# Patient Record
Sex: FEMALE | Race: OTHER | NOT HISPANIC OR LATINO
[De-identification: names, ages, dates, MRNs, and addresses within clinical notes are randomized per-mention and may not be internally consistent; named-entity substitution may affect disease eponyms.]

---

## 2017-04-27 ENCOUNTER — APPOINTMENT (OUTPATIENT)
Dept: NEUROLOGY | Facility: CLINIC | Age: 42
End: 2017-04-27

## 2017-04-27 VITALS
HEIGHT: 64 IN | HEART RATE: 76 BPM | BODY MASS INDEX: 26.29 KG/M2 | WEIGHT: 154 LBS | DIASTOLIC BLOOD PRESSURE: 60 MMHG | SYSTOLIC BLOOD PRESSURE: 110 MMHG

## 2017-04-27 DIAGNOSIS — Z87.898 PERSONAL HISTORY OF OTHER SPECIFIED CONDITIONS: ICD-10-CM

## 2017-04-27 DIAGNOSIS — M94.0 CHONDROCOSTAL JUNCTION SYNDROME [TIETZE]: ICD-10-CM

## 2017-04-27 DIAGNOSIS — Z78.9 OTHER SPECIFIED HEALTH STATUS: ICD-10-CM

## 2017-04-27 DIAGNOSIS — I34.0 NONRHEUMATIC MITRAL (VALVE) INSUFFICIENCY: ICD-10-CM

## 2017-04-27 DIAGNOSIS — Z82.49 FAMILY HISTORY OF ISCHEMIC HEART DISEASE AND OTHER DISEASES OF THE CIRCULATORY SYSTEM: ICD-10-CM

## 2017-04-27 RX ORDER — DICLOFENAC POTASSIUM 50 MG/1
TABLET, COATED ORAL
Refills: 0 | Status: ACTIVE | COMMUNITY

## 2017-05-05 ENCOUNTER — APPOINTMENT (OUTPATIENT)
Dept: OTOLARYNGOLOGY | Facility: CLINIC | Age: 42
End: 2017-05-05

## 2017-05-05 VITALS
HEIGHT: 64.4 IN | SYSTOLIC BLOOD PRESSURE: 126 MMHG | BODY MASS INDEX: 25.64 KG/M2 | DIASTOLIC BLOOD PRESSURE: 74 MMHG | HEART RATE: 77 BPM | WEIGHT: 152 LBS

## 2017-06-19 ENCOUNTER — APPOINTMENT (OUTPATIENT)
Dept: PAIN MANAGEMENT | Facility: CLINIC | Age: 42
End: 2017-06-19

## 2017-06-19 VITALS
DIASTOLIC BLOOD PRESSURE: 74 MMHG | SYSTOLIC BLOOD PRESSURE: 117 MMHG | WEIGHT: 152 LBS | HEIGHT: 64.75 IN | BODY MASS INDEX: 25.64 KG/M2 | HEART RATE: 84 BPM

## 2017-06-19 RX ORDER — NORTRIPTYLINE HYDROCHLORIDE 10 MG/1
10 CAPSULE ORAL
Qty: 30 | Refills: 0 | Status: COMPLETED | COMMUNITY
Start: 2016-12-15

## 2017-06-23 ENCOUNTER — MEDICATION RENEWAL (OUTPATIENT)
Age: 42
End: 2017-06-23

## 2017-06-23 ENCOUNTER — APPOINTMENT (OUTPATIENT)
Dept: MRI IMAGING | Facility: CLINIC | Age: 42
End: 2017-06-23

## 2017-06-23 ENCOUNTER — FORM ENCOUNTER (OUTPATIENT)
Age: 42
End: 2017-06-23

## 2017-06-24 ENCOUNTER — APPOINTMENT (OUTPATIENT)
Dept: MRI IMAGING | Facility: CLINIC | Age: 42
End: 2017-06-24

## 2017-06-24 ENCOUNTER — OUTPATIENT (OUTPATIENT)
Dept: OUTPATIENT SERVICES | Facility: HOSPITAL | Age: 42
LOS: 1 days | End: 2017-06-24
Payer: COMMERCIAL

## 2017-06-24 DIAGNOSIS — Z00.8 ENCOUNTER FOR OTHER GENERAL EXAMINATION: ICD-10-CM

## 2017-06-24 PROCEDURE — 70549 MR ANGIOGRAPH NECK W/O&W/DYE: CPT

## 2017-07-10 ENCOUNTER — APPOINTMENT (OUTPATIENT)
Dept: NEUROLOGY | Facility: CLINIC | Age: 42
End: 2017-07-10

## 2017-07-10 VITALS
SYSTOLIC BLOOD PRESSURE: 110 MMHG | HEART RATE: 80 BPM | BODY MASS INDEX: 25.3 KG/M2 | HEIGHT: 64.75 IN | DIASTOLIC BLOOD PRESSURE: 71 MMHG | WEIGHT: 150 LBS

## 2017-07-18 ENCOUNTER — MEDICATION RENEWAL (OUTPATIENT)
Age: 42
End: 2017-07-18

## 2017-08-01 ENCOUNTER — FORM ENCOUNTER (OUTPATIENT)
Age: 42
End: 2017-08-01

## 2017-08-01 ENCOUNTER — MESSAGE (OUTPATIENT)
Age: 42
End: 2017-08-01

## 2017-08-01 RX ORDER — DEXAMETHASONE 4 MG/1
4 TABLET ORAL
Qty: 20 | Refills: 0 | Status: ACTIVE | COMMUNITY
Start: 2017-08-01 | End: 1900-01-01

## 2017-08-02 ENCOUNTER — APPOINTMENT (OUTPATIENT)
Dept: MRI IMAGING | Facility: CLINIC | Age: 42
End: 2017-08-02
Payer: COMMERCIAL

## 2017-08-02 ENCOUNTER — OUTPATIENT (OUTPATIENT)
Dept: OUTPATIENT SERVICES | Facility: HOSPITAL | Age: 42
LOS: 1 days | End: 2017-08-02
Payer: COMMERCIAL

## 2017-08-02 DIAGNOSIS — R53.1 WEAKNESS: ICD-10-CM

## 2017-08-02 PROCEDURE — 70553 MRI BRAIN STEM W/O & W/DYE: CPT | Mod: 26

## 2017-08-02 PROCEDURE — 70553 MRI BRAIN STEM W/O & W/DYE: CPT

## 2017-08-02 PROCEDURE — A9585: CPT

## 2017-08-03 ENCOUNTER — APPOINTMENT (OUTPATIENT)
Dept: PAIN MANAGEMENT | Facility: CLINIC | Age: 42
End: 2017-08-03

## 2017-08-07 ENCOUNTER — APPOINTMENT (OUTPATIENT)
Dept: PAIN MANAGEMENT | Facility: CLINIC | Age: 42
End: 2017-08-07
Payer: COMMERCIAL

## 2017-08-07 VITALS
HEIGHT: 64.75 IN | DIASTOLIC BLOOD PRESSURE: 72 MMHG | HEART RATE: 63 BPM | SYSTOLIC BLOOD PRESSURE: 115 MMHG | WEIGHT: 153 LBS | BODY MASS INDEX: 25.8 KG/M2

## 2017-08-07 PROCEDURE — 99213 OFFICE O/P EST LOW 20 MIN: CPT

## 2017-08-07 RX ORDER — TOPIRAMATE 25 MG/1
25 TABLET, FILM COATED ORAL
Qty: 120 | Refills: 2 | Status: DISCONTINUED | COMMUNITY
Start: 2017-06-19 | End: 2017-08-07

## 2017-08-09 ENCOUNTER — MESSAGE (OUTPATIENT)
Age: 42
End: 2017-08-09

## 2017-08-11 ENCOUNTER — MESSAGE (OUTPATIENT)
Age: 42
End: 2017-08-11

## 2017-08-13 ENCOUNTER — FORM ENCOUNTER (OUTPATIENT)
Age: 42
End: 2017-08-13

## 2017-08-14 ENCOUNTER — OUTPATIENT (OUTPATIENT)
Dept: OUTPATIENT SERVICES | Facility: HOSPITAL | Age: 42
LOS: 1 days | End: 2017-08-14
Payer: COMMERCIAL

## 2017-08-14 ENCOUNTER — APPOINTMENT (OUTPATIENT)
Dept: MRI IMAGING | Facility: CLINIC | Age: 42
End: 2017-08-14
Payer: COMMERCIAL

## 2017-08-14 ENCOUNTER — MEDICATION RENEWAL (OUTPATIENT)
Age: 42
End: 2017-08-14

## 2017-08-14 DIAGNOSIS — R53.1 WEAKNESS: ICD-10-CM

## 2017-08-14 PROCEDURE — 72156 MRI NECK SPINE W/O & W/DYE: CPT | Mod: 26

## 2017-08-14 PROCEDURE — 72156 MRI NECK SPINE W/O & W/DYE: CPT

## 2017-08-14 PROCEDURE — A9585: CPT

## 2017-08-29 ENCOUNTER — MESSAGE (OUTPATIENT)
Age: 42
End: 2017-08-29

## 2017-09-07 ENCOUNTER — APPOINTMENT (OUTPATIENT)
Dept: PAIN MANAGEMENT | Facility: CLINIC | Age: 42
End: 2017-09-07
Payer: COMMERCIAL

## 2017-09-07 VITALS
BODY MASS INDEX: 26.46 KG/M2 | HEIGHT: 64 IN | WEIGHT: 155 LBS | HEART RATE: 78 BPM | DIASTOLIC BLOOD PRESSURE: 57 MMHG | SYSTOLIC BLOOD PRESSURE: 105 MMHG

## 2017-09-07 PROCEDURE — 99213 OFFICE O/P EST LOW 20 MIN: CPT

## 2017-09-07 RX ORDER — NABUMETONE 750 MG/1
750 TABLET, FILM COATED ORAL
Qty: 50 | Refills: 3 | Status: DISCONTINUED | COMMUNITY
Start: 2017-06-19 | End: 2017-09-07

## 2017-09-07 RX ORDER — VENLAFAXINE HYDROCHLORIDE 37.5 MG/1
37.5 CAPSULE, EXTENDED RELEASE ORAL
Qty: 70 | Refills: 0 | Status: DISCONTINUED | COMMUNITY
Start: 2017-07-18 | End: 2017-09-07

## 2017-09-12 ENCOUNTER — APPOINTMENT (OUTPATIENT)
Dept: NEUROLOGY | Facility: CLINIC | Age: 42
End: 2017-09-12
Payer: COMMERCIAL

## 2017-09-12 PROCEDURE — 95885 MUSC TST DONE W/NERV TST LIM: CPT

## 2017-09-12 PROCEDURE — 95911 NRV CNDJ TEST 9-10 STUDIES: CPT

## 2017-10-19 ENCOUNTER — APPOINTMENT (OUTPATIENT)
Dept: PAIN MANAGEMENT | Facility: CLINIC | Age: 42
End: 2017-10-19
Payer: COMMERCIAL

## 2017-10-19 ENCOUNTER — APPOINTMENT (OUTPATIENT)
Dept: NEUROLOGY | Facility: CLINIC | Age: 42
End: 2017-10-19
Payer: COMMERCIAL

## 2017-10-19 VITALS
BODY MASS INDEX: 26.63 KG/M2 | HEART RATE: 83 BPM | HEIGHT: 64 IN | DIASTOLIC BLOOD PRESSURE: 76 MMHG | SYSTOLIC BLOOD PRESSURE: 110 MMHG | WEIGHT: 156 LBS

## 2017-10-19 VITALS
HEART RATE: 86 BPM | BODY MASS INDEX: 26.63 KG/M2 | WEIGHT: 156 LBS | DIASTOLIC BLOOD PRESSURE: 75 MMHG | SYSTOLIC BLOOD PRESSURE: 113 MMHG | HEIGHT: 64 IN

## 2017-10-19 PROCEDURE — 99213 OFFICE O/P EST LOW 20 MIN: CPT

## 2017-10-19 PROCEDURE — 99214 OFFICE O/P EST MOD 30 MIN: CPT

## 2017-10-19 RX ORDER — LORAZEPAM 0.5 MG/1
0.5 TABLET ORAL
Qty: 2 | Refills: 0 | Status: DISCONTINUED | COMMUNITY
Start: 2017-07-18 | End: 2017-10-19

## 2017-10-19 RX ORDER — DULOXETINE HYDROCHLORIDE 20 MG/1
20 CAPSULE, DELAYED RELEASE PELLETS ORAL
Qty: 60 | Refills: 4 | Status: DISCONTINUED | COMMUNITY
Start: 2017-08-29 | End: 2017-10-19

## 2017-10-19 RX ORDER — LORAZEPAM 0.5 MG/1
0.5 TABLET ORAL
Qty: 2 | Refills: 0 | Status: DISCONTINUED | COMMUNITY
Start: 2017-06-23 | End: 2017-10-19

## 2017-10-19 RX ORDER — DIHYDROERGOTAMINE MESYLATE 4 MG/ML
4 SPRAY NASAL
Qty: 1 | Refills: 0 | Status: DISCONTINUED | COMMUNITY
Start: 2017-08-08 | End: 2017-10-19

## 2017-10-19 RX ORDER — DESVENLAFAXINE 25 MG/1
25 TABLET, EXTENDED RELEASE ORAL
Qty: 30 | Refills: 2 | Status: DISCONTINUED | COMMUNITY
Start: 2017-09-07 | End: 2017-10-19

## 2017-10-19 RX ORDER — DAPSONE 75 MG/G
7.5 GEL TOPICAL
Qty: 90 | Refills: 0 | Status: ACTIVE | COMMUNITY
Start: 2017-09-13

## 2017-10-19 RX ORDER — ADAPALENE AND BENZOYL PEROXIDE 3; 25 MG/G; MG/G
0.3-2.5 GEL TOPICAL
Qty: 45 | Refills: 0 | Status: ACTIVE | COMMUNITY
Start: 2017-09-13

## 2017-10-30 ENCOUNTER — TRANSCRIPTION ENCOUNTER (OUTPATIENT)
Age: 42
End: 2017-10-30

## 2017-11-09 ENCOUNTER — APPOINTMENT (OUTPATIENT)
Dept: PAIN MANAGEMENT | Facility: CLINIC | Age: 42
End: 2017-11-09
Payer: COMMERCIAL

## 2017-11-09 VITALS
SYSTOLIC BLOOD PRESSURE: 118 MMHG | WEIGHT: 158 LBS | DIASTOLIC BLOOD PRESSURE: 79 MMHG | BODY MASS INDEX: 26.98 KG/M2 | HEIGHT: 64 IN | HEART RATE: 79 BPM

## 2017-11-09 PROCEDURE — 99214 OFFICE O/P EST MOD 30 MIN: CPT

## 2017-11-16 ENCOUNTER — EMERGENCY (EMERGENCY)
Facility: HOSPITAL | Age: 42
LOS: 1 days | Discharge: HOME | End: 2017-11-16
Attending: EMERGENCY MEDICINE
Payer: COMMERCIAL

## 2017-11-16 VITALS
OXYGEN SATURATION: 100 % | RESPIRATION RATE: 18 BRPM | DIASTOLIC BLOOD PRESSURE: 65 MMHG | SYSTOLIC BLOOD PRESSURE: 123 MMHG | HEART RATE: 82 BPM

## 2017-11-16 VITALS
RESPIRATION RATE: 20 BRPM | OXYGEN SATURATION: 99 % | HEIGHT: 65 IN | HEART RATE: 108 BPM | SYSTOLIC BLOOD PRESSURE: 136 MMHG | TEMPERATURE: 98 F | DIASTOLIC BLOOD PRESSURE: 84 MMHG | WEIGHT: 156.09 LBS

## 2017-11-16 PROCEDURE — 99284 EMERGENCY DEPT VISIT MOD MDM: CPT | Mod: 25

## 2017-11-16 PROCEDURE — 73700 CT LOWER EXTREMITY W/O DYE: CPT

## 2017-11-16 PROCEDURE — 73562 X-RAY EXAM OF KNEE 3: CPT

## 2017-11-16 PROCEDURE — 73562 X-RAY EXAM OF KNEE 3: CPT | Mod: 26,LT

## 2017-11-16 PROCEDURE — 76377 3D RENDER W/INTRP POSTPROCES: CPT

## 2017-11-16 PROCEDURE — 99285 EMERGENCY DEPT VISIT HI MDM: CPT | Mod: 25

## 2017-11-16 PROCEDURE — 76377 3D RENDER W/INTRP POSTPROCES: CPT | Mod: 26

## 2017-11-16 PROCEDURE — 73700 CT LOWER EXTREMITY W/O DYE: CPT | Mod: 26,LT

## 2017-11-16 RX ORDER — ACETAMINOPHEN 500 MG
650 TABLET ORAL ONCE
Qty: 0 | Refills: 0 | Status: COMPLETED | OUTPATIENT
Start: 2017-11-16 | End: 2017-11-16

## 2017-11-16 RX ORDER — IBUPROFEN 200 MG
600 TABLET ORAL ONCE
Qty: 0 | Refills: 0 | Status: COMPLETED | OUTPATIENT
Start: 2017-11-16 | End: 2017-11-16

## 2017-11-16 RX ORDER — OXYCODONE HYDROCHLORIDE 5 MG/1
5 TABLET ORAL ONCE
Qty: 0 | Refills: 0 | Status: DISCONTINUED | OUTPATIENT
Start: 2017-11-16 | End: 2017-11-16

## 2017-11-16 RX ORDER — OXYCODONE HYDROCHLORIDE 5 MG/1
1 TABLET ORAL
Qty: 12 | Refills: 0 | OUTPATIENT
Start: 2017-11-16 | End: 2017-11-20

## 2017-11-16 RX ADMIN — Medication 650 MILLIGRAM(S): at 00:58

## 2017-11-16 RX ADMIN — Medication 600 MILLIGRAM(S): at 00:58

## 2017-11-16 RX ADMIN — Medication 650 MILLIGRAM(S): at 01:44

## 2017-11-16 RX ADMIN — Medication 600 MILLIGRAM(S): at 01:44

## 2017-11-16 RX ADMIN — OXYCODONE HYDROCHLORIDE 5 MILLIGRAM(S): 5 TABLET ORAL at 03:27

## 2017-11-16 NOTE — ED ADULT NURSE NOTE - OBJECTIVE STATEMENT
43 yo F pmh of partial thyroidectomy, and chronic migraines came to ED c/o left knee pain and swelling s/p fall at 17:00 today.  Pt states that she was at work when she had a mechanical slip and fall landing directly onto her left knee.  Pt was able to ambulate on leg after the event, and states that the pain gradually got worse as the night progressed.  Pt denies taking any pain medication for the injury.  Used a cane to help walk into [facility] ED.  Denies hitting head, LOC, numbness or tingling, n/v/d, fevers/chills, CP, back pain, SOB.  A&OX4, pt neurologically intact, had sensation in both extremities, slightly diminished in left extremity below the knee.  +peripheral pulses bilaterally.  Limited ROM on left leg due to pain and swelling.  Safety and comfort maintained.   present at bedside.  Will continue to monitor. 43 yo F pmh of partial thyroidectomy, and chronic migraines came to ED c/o left knee pain and swelling s/p fall at 17:00 today.  Pt states that she was at work when she had a mechanical slip and fall landing directly onto her left knee.  Pt was able to ambulate on leg after the event, and states that the pain gradually got worse as the night progressed.  Pt denies taking any pain medication for the injury.  Used a cane to help walk into [facility] ED.  Denies hitting head, LOC, numbness or tingling, n/v/d, fevers/chills, CP, back pain, SOB.  A&OX4, pt neurologically intact, had sensation in both extremities, slightly diminished in left extremity below the knee.  +peripheral pulses bilaterally.  Limited ROM on left leg due to pain and swelling.  Pt left knee has visible swelling, no bruising or skin lacerations noted.  Pt in visible distress from knee pain, tearful and expressing pain, MD Zheng made aware and currently at bedside to evaluate.  Safety and comfort maintained.   present at bedside.  Will continue to monitor.

## 2017-11-16 NOTE — ED PROVIDER NOTE - OBJECTIVE STATEMENT
42F pmh migraines, partial thyroidectomy, tubal ligation p/w left knee pain s/p injury.  Pt was walking in flip flops at home, slipped and fell onto her left knee cap around 5pm.  Pt was able to walk with a limp, but pain worsening as night went on. 42F pmh migraines, partial thyroidectomy, tubal ligation p/w left knee pain s/p injury.  Pt was walking in flip flops at home, slipped and fell onto her left knee cap around 5pm.  Pt was able to walk with a limp, but pain worsening as night went on.    Attendinyo female presents with left knee pain s/p fall directly on the knee earlier this evening while walking in flipflops.  Has pain with ambulation and cannot bear weight on the knee.

## 2017-11-16 NOTE — ED PROVIDER NOTE - PHYSICAL EXAMINATION
Gen: moderate distress due to pain, AOx3  Head: NCAT  HEENT: PERRL, oral mucosa moist, normal conjunctiva  Lung: CTAB, no respiratory distress  CV: rrr, no murmurs, Normal perfusion  MSK: Mild edema of left knee, severe TTP, no ecchymosis or erythema.  +DP pulses bilaterally, equal sensation bilaterally.  ROM refused 2/2 pain  Psych: normal affect   - Mary Norm, DO

## 2017-11-16 NOTE — ED PROVIDER NOTE - CARE PLAN
Principal Discharge DX:	Knee pain, left  Instructions for follow-up, activity and diet:	- Rest, ice, and elevate the extremity.   - Take Motrin 400-600mg every 6 hrs as needed for pain. Take with food   - Take Tylenol 650mg every 6 hrs as needed for pain.   - A prescription for pain medication was sent to your pharmacy - please take only for break-through, severe pain.  Do not drive while taking this medication.   - Follow up with Sports Medicine or orthopedics by calling tomorrow

## 2017-11-16 NOTE — ED PROVIDER NOTE - PLAN OF CARE
- Rest, ice, and elevate the extremity.   - Take Motrin 400-600mg every 6 hrs as needed for pain. Take with food   - Take Tylenol 650mg every 6 hrs as needed for pain.   - A prescription for pain medication was sent to your pharmacy - please take only for break-through, severe pain.  Do not drive while taking this medication.   - Follow up with Sports Medicine or orthopedics by calling tomorrow

## 2017-11-16 NOTE — ED PROVIDER NOTE - PROGRESS NOTE DETAILS
Pt's pain improved after motrin/tyleno, but still in acute pain with motion.  Dionisio give oxycodone.    - Mary Zheng, DO

## 2017-11-16 NOTE — ED ADULT TRIAGE NOTE - CHIEF COMPLAINT QUOTE
left leg pain s/p fall at about 5 pm; was walking on flip flops; was ambulatory but worsening pain; tried using ice to site, but getting worse

## 2017-11-16 NOTE — ED ADULT NURSE REASSESSMENT NOTE - NS ED NURSE REASSESS COMMENT FT1
Pt demonstrated ambulating with crutches well.  Offered wheelchair to bring out to triage, pt refused.  discharge papers given and signed by patient.  Pt is not driving,  present at bedside and will drive her home.  Pt educated on importance of not driving while on oxycodone.
Pt is resting comfortably, is no longer in visible distress from pain.  States that pain is tolerable.  Will continue to monitor.

## 2018-01-06 ENCOUNTER — APPOINTMENT (OUTPATIENT)
Dept: MAMMOGRAPHY | Facility: IMAGING CENTER | Age: 43
End: 2018-01-06

## 2018-01-27 ENCOUNTER — APPOINTMENT (OUTPATIENT)
Dept: MAMMOGRAPHY | Facility: IMAGING CENTER | Age: 43
End: 2018-01-27
Payer: COMMERCIAL

## 2018-01-27 ENCOUNTER — OUTPATIENT (OUTPATIENT)
Dept: OUTPATIENT SERVICES | Facility: HOSPITAL | Age: 43
LOS: 1 days | End: 2018-01-27
Payer: COMMERCIAL

## 2018-01-27 DIAGNOSIS — Z00.8 ENCOUNTER FOR OTHER GENERAL EXAMINATION: ICD-10-CM

## 2018-01-27 PROCEDURE — 77067 SCR MAMMO BI INCL CAD: CPT

## 2018-01-27 PROCEDURE — 77063 BREAST TOMOSYNTHESIS BI: CPT

## 2018-01-27 PROCEDURE — 77063 BREAST TOMOSYNTHESIS BI: CPT | Mod: 26

## 2018-01-27 PROCEDURE — 77067 SCR MAMMO BI INCL CAD: CPT | Mod: 26

## 2018-02-22 ENCOUNTER — APPOINTMENT (OUTPATIENT)
Dept: NEUROLOGY | Facility: CLINIC | Age: 43
End: 2018-02-22
Payer: COMMERCIAL

## 2018-02-22 VITALS
WEIGHT: 165 LBS | HEIGHT: 64 IN | SYSTOLIC BLOOD PRESSURE: 119 MMHG | DIASTOLIC BLOOD PRESSURE: 79 MMHG | BODY MASS INDEX: 28.17 KG/M2 | HEART RATE: 68 BPM

## 2018-02-22 DIAGNOSIS — R53.1 WEAKNESS: ICD-10-CM

## 2018-02-22 PROCEDURE — 99213 OFFICE O/P EST LOW 20 MIN: CPT

## 2018-02-23 ENCOUNTER — APPOINTMENT (OUTPATIENT)
Dept: PAIN MANAGEMENT | Facility: CLINIC | Age: 43
End: 2018-02-23
Payer: COMMERCIAL

## 2018-02-23 VITALS
WEIGHT: 165 LBS | DIASTOLIC BLOOD PRESSURE: 81 MMHG | HEART RATE: 71 BPM | BODY MASS INDEX: 28.17 KG/M2 | SYSTOLIC BLOOD PRESSURE: 122 MMHG | HEIGHT: 64 IN

## 2018-02-23 PROCEDURE — 99214 OFFICE O/P EST MOD 30 MIN: CPT

## 2018-03-23 ENCOUNTER — OUTPATIENT (OUTPATIENT)
Dept: OUTPATIENT SERVICES | Facility: HOSPITAL | Age: 43
LOS: 1 days | End: 2018-03-23
Payer: COMMERCIAL

## 2018-03-23 ENCOUNTER — APPOINTMENT (OUTPATIENT)
Dept: ULTRASOUND IMAGING | Facility: IMAGING CENTER | Age: 43
End: 2018-03-23
Payer: COMMERCIAL

## 2018-03-23 DIAGNOSIS — Z00.8 ENCOUNTER FOR OTHER GENERAL EXAMINATION: ICD-10-CM

## 2018-03-23 PROCEDURE — 76641 ULTRASOUND BREAST COMPLETE: CPT | Mod: 26,50

## 2018-03-23 PROCEDURE — 76641 ULTRASOUND BREAST COMPLETE: CPT

## 2018-04-21 ENCOUNTER — APPOINTMENT (OUTPATIENT)
Dept: MRI IMAGING | Facility: CLINIC | Age: 43
End: 2018-04-21

## 2018-04-24 ENCOUNTER — OTHER (OUTPATIENT)
Age: 43
End: 2018-04-24

## 2018-04-28 ENCOUNTER — FORM ENCOUNTER (OUTPATIENT)
Age: 43
End: 2018-04-28

## 2018-04-29 ENCOUNTER — OUTPATIENT (OUTPATIENT)
Dept: OUTPATIENT SERVICES | Facility: HOSPITAL | Age: 43
LOS: 1 days | End: 2018-04-29
Payer: COMMERCIAL

## 2018-04-29 ENCOUNTER — APPOINTMENT (OUTPATIENT)
Dept: MRI IMAGING | Facility: CLINIC | Age: 43
End: 2018-04-29
Payer: COMMERCIAL

## 2018-04-29 DIAGNOSIS — G43.709 CHRONIC MIGRAINE WITHOUT AURA, NOT INTRACTABLE, WITHOUT STATUS MIGRAINOSUS: ICD-10-CM

## 2018-04-29 PROCEDURE — 70551 MRI BRAIN STEM W/O DYE: CPT | Mod: 26

## 2018-04-29 PROCEDURE — 70551 MRI BRAIN STEM W/O DYE: CPT

## 2018-05-01 ENCOUNTER — EMERGENCY (EMERGENCY)
Facility: HOSPITAL | Age: 43
LOS: 1 days | Discharge: HOME | End: 2018-05-01
Attending: EMERGENCY MEDICINE | Admitting: EMERGENCY MEDICINE
Payer: COMMERCIAL

## 2018-05-01 VITALS
OXYGEN SATURATION: 97 % | TEMPERATURE: 98 F | DIASTOLIC BLOOD PRESSURE: 83 MMHG | SYSTOLIC BLOOD PRESSURE: 121 MMHG | RESPIRATION RATE: 18 BRPM | HEART RATE: 82 BPM

## 2018-05-01 VITALS
DIASTOLIC BLOOD PRESSURE: 74 MMHG | HEART RATE: 90 BPM | SYSTOLIC BLOOD PRESSURE: 128 MMHG | WEIGHT: 160.06 LBS | OXYGEN SATURATION: 98 % | RESPIRATION RATE: 20 BRPM | TEMPERATURE: 99 F

## 2018-05-01 PROCEDURE — 71046 X-RAY EXAM CHEST 2 VIEWS: CPT | Mod: 26

## 2018-05-01 PROCEDURE — 71046 X-RAY EXAM CHEST 2 VIEWS: CPT

## 2018-05-01 PROCEDURE — 99283 EMERGENCY DEPT VISIT LOW MDM: CPT

## 2018-05-01 NOTE — ED PROVIDER NOTE - MEDICAL DECISION MAKING DETAILS
43 y/o F pt with PMHx of migraines, costochondritis presents to the ED for significant nonproductive cough x4 days now associated with chest wall pain. Vital signs stable, lungs clear. Plan: CXR ordered, stronger cough medicine Rx'd, likely DC home bronchitis.

## 2018-05-01 NOTE — ED ADULT TRIAGE NOTE - WEIGHT METHOD
stated Bilobed Flap Text: The defect edges were debeveled with a #15 scalpel blade.  Given the location of the defect and the proximity to free margins a bilobe flap was deemed most appropriate.  Using a sterile surgical marker, an appropriate bilobe flap drawn around the defect.    The area thus outlined was incised deep to adipose tissue with a #15 scalpel blade.  The skin margins were undermined to an appropriate distance in all directions utilizing iris scissors.

## 2018-05-01 NOTE — ED PROVIDER NOTE - OBJECTIVE STATEMENT
41 y/o F pt with PMHx of migraine, costochondritis c/o nonproductive cough x4 days now associated with right sided neck pain and chest wall pain. Also notes cough. States that sx started with sore throat but now is resolved. Pt is taking Robitussin at home with no relief. Pt is normally taking muscle relaxers for the migraines and notes it doesn't help with the pain. Denies fever, chills or any other complaints. NKDA.

## 2018-05-01 NOTE — ED ADULT NURSE NOTE - CHPI ED SYMPTOMS NEG
no wheezing/no diaphoresis/no headache/no body aches/no shortness of breath/no chills/no fever/no edema/no hemoptysis

## 2018-05-01 NOTE — ED ADULT NURSE NOTE - DISCHARGE TEACHING
Jeanna MINAYA, pt verbalizes understanding to  prescription from pharmacy, f/u with PCP and return to ED for any worsening symptoms.

## 2018-05-01 NOTE — ED PROVIDER NOTE - PHYSICAL EXAMINATION
TTP over costocondral bilaeral anterior lungs clear, right lateral submandibular region one isolated lymph node swelling, throat mild injection no exudates

## 2018-05-02 NOTE — ED ADULT NURSE REASSESSMENT NOTE - NS ED NURSE REASSESS COMMENT FT1
Pt AAOx4, NAD, resting comfortably in bed with spouse at bedside. Pt states "I feel better, I want to go home." Pt reports improvement in cough and improvement in pleuritic chest pain to 3/10. Pt denies headache, dizziness, SOB, fevers, chills, weakness at this time. Pt discharged as per MD, pt ambulated independently out of ED, steady gait noted.

## 2018-05-02 NOTE — ED ADULT NURSE REASSESSMENT NOTE - GENERAL PATIENT STATE
smiling/interactive/comfortable appearance/family/SO at bedside/resting/sleeping/cooperative/improvement verbalized

## 2018-05-04 ENCOUNTER — APPOINTMENT (OUTPATIENT)
Dept: OTOLARYNGOLOGY | Facility: CLINIC | Age: 43
End: 2018-05-04
Payer: COMMERCIAL

## 2018-05-04 VITALS
HEIGHT: 64 IN | HEART RATE: 105 BPM | WEIGHT: 160 LBS | DIASTOLIC BLOOD PRESSURE: 78 MMHG | BODY MASS INDEX: 27.31 KG/M2 | SYSTOLIC BLOOD PRESSURE: 118 MMHG

## 2018-05-04 DIAGNOSIS — R49.0 DYSPHONIA: ICD-10-CM

## 2018-05-04 DIAGNOSIS — J06.9 ACUTE UPPER RESPIRATORY INFECTION, UNSPECIFIED: ICD-10-CM

## 2018-05-04 PROCEDURE — 31575 DIAGNOSTIC LARYNGOSCOPY: CPT

## 2018-05-04 PROCEDURE — 99214 OFFICE O/P EST MOD 30 MIN: CPT | Mod: 25

## 2018-05-04 PROCEDURE — 69210 REMOVE IMPACTED EAR WAX UNI: CPT

## 2018-06-12 ENCOUNTER — OUTPATIENT (OUTPATIENT)
Dept: OUTPATIENT SERVICES | Facility: HOSPITAL | Age: 43
LOS: 1 days | End: 2018-06-12
Payer: COMMERCIAL

## 2018-06-12 ENCOUNTER — APPOINTMENT (OUTPATIENT)
Dept: MRI IMAGING | Facility: CLINIC | Age: 43
End: 2018-06-12
Payer: COMMERCIAL

## 2018-06-12 DIAGNOSIS — Z00.8 ENCOUNTER FOR OTHER GENERAL EXAMINATION: ICD-10-CM

## 2018-06-12 PROCEDURE — 72141 MRI NECK SPINE W/O DYE: CPT

## 2018-06-12 PROCEDURE — 72141 MRI NECK SPINE W/O DYE: CPT | Mod: 26

## 2018-06-14 ENCOUNTER — APPOINTMENT (OUTPATIENT)
Dept: PAIN MANAGEMENT | Facility: CLINIC | Age: 43
End: 2018-06-14
Payer: COMMERCIAL

## 2018-06-14 VITALS
BODY MASS INDEX: 27.31 KG/M2 | WEIGHT: 160 LBS | HEART RATE: 93 BPM | SYSTOLIC BLOOD PRESSURE: 152 MMHG | DIASTOLIC BLOOD PRESSURE: 76 MMHG | HEIGHT: 64 IN

## 2018-06-14 PROCEDURE — 99214 OFFICE O/P EST MOD 30 MIN: CPT

## 2018-09-20 ENCOUNTER — APPOINTMENT (OUTPATIENT)
Dept: PAIN MANAGEMENT | Facility: CLINIC | Age: 43
End: 2018-09-20
Payer: COMMERCIAL

## 2018-09-20 VITALS
DIASTOLIC BLOOD PRESSURE: 78 MMHG | BODY MASS INDEX: 28 KG/M2 | SYSTOLIC BLOOD PRESSURE: 113 MMHG | HEART RATE: 83 BPM | HEIGHT: 64 IN | WEIGHT: 164 LBS

## 2018-09-20 PROCEDURE — 99214 OFFICE O/P EST MOD 30 MIN: CPT

## 2018-09-24 ENCOUNTER — EMERGENCY (EMERGENCY)
Facility: HOSPITAL | Age: 43
LOS: 1 days | Discharge: HOME | End: 2018-09-24
Attending: EMERGENCY MEDICINE
Payer: COMMERCIAL

## 2018-09-24 VITALS
HEART RATE: 92 BPM | WEIGHT: 160.06 LBS | DIASTOLIC BLOOD PRESSURE: 80 MMHG | RESPIRATION RATE: 20 BRPM | TEMPERATURE: 98 F | SYSTOLIC BLOOD PRESSURE: 127 MMHG | OXYGEN SATURATION: 100 % | HEIGHT: 64 IN

## 2018-09-24 LAB
ALBUMIN SERPL ELPH-MCNC: 4.7 G/DL — SIGNIFICANT CHANGE UP (ref 3.3–5)
ALP SERPL-CCNC: 62 U/L — SIGNIFICANT CHANGE UP (ref 40–120)
ALT FLD-CCNC: 15 U/L — SIGNIFICANT CHANGE UP (ref 10–45)
ANION GAP SERPL CALC-SCNC: 8 MMOL/L — SIGNIFICANT CHANGE UP (ref 5–17)
AST SERPL-CCNC: 27 U/L — SIGNIFICANT CHANGE UP (ref 10–40)
BILIRUB SERPL-MCNC: 0.3 MG/DL — SIGNIFICANT CHANGE UP (ref 0.2–1.2)
BUN SERPL-MCNC: 10 MG/DL — SIGNIFICANT CHANGE UP (ref 7–23)
CALCIUM SERPL-MCNC: 10.4 MG/DL — SIGNIFICANT CHANGE UP (ref 8.4–10.5)
CHLORIDE SERPL-SCNC: 105 MMOL/L — SIGNIFICANT CHANGE UP (ref 96–108)
CO2 SERPL-SCNC: 27 MMOL/L — SIGNIFICANT CHANGE UP (ref 22–31)
CREAT SERPL-MCNC: 0.88 MG/DL — SIGNIFICANT CHANGE UP (ref 0.5–1.3)
GLUCOSE SERPL-MCNC: 86 MG/DL — SIGNIFICANT CHANGE UP (ref 70–99)
HCG SERPL-ACNC: <2 MIU/ML — SIGNIFICANT CHANGE UP
HCT VFR BLD CALC: 42 % — SIGNIFICANT CHANGE UP (ref 34.5–45)
HGB BLD-MCNC: 14 G/DL — SIGNIFICANT CHANGE UP (ref 11.5–15.5)
MCHC RBC-ENTMCNC: 29.5 PG — SIGNIFICANT CHANGE UP (ref 27–34)
MCHC RBC-ENTMCNC: 33.5 GM/DL — SIGNIFICANT CHANGE UP (ref 32–36)
MCV RBC AUTO: 88.1 FL — SIGNIFICANT CHANGE UP (ref 80–100)
PLATELET # BLD AUTO: 323 K/UL — SIGNIFICANT CHANGE UP (ref 150–400)
POTASSIUM SERPL-MCNC: 5.1 MMOL/L — SIGNIFICANT CHANGE UP (ref 3.5–5.3)
POTASSIUM SERPL-SCNC: 5.1 MMOL/L — SIGNIFICANT CHANGE UP (ref 3.5–5.3)
PROT SERPL-MCNC: 7.6 G/DL — SIGNIFICANT CHANGE UP (ref 6–8.3)
RBC # BLD: 4.76 M/UL — SIGNIFICANT CHANGE UP (ref 3.8–5.2)
RBC # FLD: 11.6 % — SIGNIFICANT CHANGE UP (ref 10.3–14.5)
SODIUM SERPL-SCNC: 140 MMOL/L — SIGNIFICANT CHANGE UP (ref 135–145)
TROPONIN T, HIGH SENSITIVITY RESULT: <6 NG/L — SIGNIFICANT CHANGE UP (ref 0–51)
TROPONIN T, HIGH SENSITIVITY RESULT: <6 NG/L — SIGNIFICANT CHANGE UP (ref 0–51)
WBC # BLD: 4.8 K/UL — SIGNIFICANT CHANGE UP (ref 3.8–10.5)
WBC # FLD AUTO: 4.8 K/UL — SIGNIFICANT CHANGE UP (ref 3.8–10.5)

## 2018-09-24 PROCEDURE — 80053 COMPREHEN METABOLIC PANEL: CPT

## 2018-09-24 PROCEDURE — 74174 CTA ABD&PLVS W/CONTRAST: CPT

## 2018-09-24 PROCEDURE — 96375 TX/PRO/DX INJ NEW DRUG ADDON: CPT | Mod: XU

## 2018-09-24 PROCEDURE — 93005 ELECTROCARDIOGRAM TRACING: CPT

## 2018-09-24 PROCEDURE — 85027 COMPLETE CBC AUTOMATED: CPT

## 2018-09-24 PROCEDURE — 71275 CT ANGIOGRAPHY CHEST: CPT | Mod: 26

## 2018-09-24 PROCEDURE — 99285 EMERGENCY DEPT VISIT HI MDM: CPT

## 2018-09-24 PROCEDURE — 74174 CTA ABD&PLVS W/CONTRAST: CPT | Mod: 26

## 2018-09-24 PROCEDURE — 84702 CHORIONIC GONADOTROPIN TEST: CPT

## 2018-09-24 PROCEDURE — 99284 EMERGENCY DEPT VISIT MOD MDM: CPT | Mod: 25

## 2018-09-24 PROCEDURE — 96374 THER/PROPH/DIAG INJ IV PUSH: CPT | Mod: XU

## 2018-09-24 PROCEDURE — 71275 CT ANGIOGRAPHY CHEST: CPT

## 2018-09-24 PROCEDURE — 84484 ASSAY OF TROPONIN QUANT: CPT

## 2018-09-24 RX ORDER — CYCLOBENZAPRINE HYDROCHLORIDE 10 MG/1
10 TABLET, FILM COATED ORAL ONCE
Qty: 0 | Refills: 0 | Status: COMPLETED | OUTPATIENT
Start: 2018-09-24 | End: 2018-09-24

## 2018-09-24 RX ORDER — MORPHINE SULFATE 50 MG/1
4 CAPSULE, EXTENDED RELEASE ORAL ONCE
Qty: 0 | Refills: 0 | Status: DISCONTINUED | OUTPATIENT
Start: 2018-09-24 | End: 2018-09-24

## 2018-09-24 RX ORDER — ACETAMINOPHEN 500 MG
975 TABLET ORAL ONCE
Qty: 0 | Refills: 0 | Status: COMPLETED | OUTPATIENT
Start: 2018-09-24 | End: 2018-09-24

## 2018-09-24 RX ORDER — KETOROLAC TROMETHAMINE 30 MG/ML
15 SYRINGE (ML) INJECTION ONCE
Qty: 0 | Refills: 0 | Status: DISCONTINUED | OUTPATIENT
Start: 2018-09-24 | End: 2018-09-24

## 2018-09-24 RX ADMIN — Medication 15 MILLIGRAM(S): at 20:00

## 2018-09-24 RX ADMIN — MORPHINE SULFATE 4 MILLIGRAM(S): 50 CAPSULE, EXTENDED RELEASE ORAL at 21:23

## 2018-09-24 RX ADMIN — MORPHINE SULFATE 4 MILLIGRAM(S): 50 CAPSULE, EXTENDED RELEASE ORAL at 21:50

## 2018-09-24 RX ADMIN — Medication 15 MILLIGRAM(S): at 19:32

## 2018-09-24 RX ADMIN — CYCLOBENZAPRINE HYDROCHLORIDE 10 MILLIGRAM(S): 10 TABLET, FILM COATED ORAL at 23:42

## 2018-09-24 RX ADMIN — Medication 975 MILLIGRAM(S): at 23:42

## 2018-09-24 NOTE — ED SUB INTERN NOTE - OBJECTIVE STATEMENT FT
Ms. Vegas is a 43 year old female with PMHx significant for costochondritis presenting with substernal chest pain that began four days ago, waxing and waning in quality until yesterday (09/23/18), when pain became constant and radiated to her back. Patient states pain is sharp, worsens with inspiration and walking. Patient stated she took diclofenac and flexeril for her symptoms yesterday (09/23/18), but denied any relief, causing her to present to ED. Otherwise, (-) fever, (-) diaphoresis, (-) SOB, (-) nausea, (-) vomiting, (-) abdominal pain.

## 2018-09-24 NOTE — ED ADULT NURSE NOTE - NSIMPLEMENTINTERV_GEN_ALL_ED
Implemented All Universal Safety Interventions:  Lily to call system. Call bell, personal items and telephone within reach. Instruct patient to call for assistance. Room bathroom lighting operational. Non-slip footwear when patient is off stretcher. Physically safe environment: no spills, clutter or unnecessary equipment. Stretcher in lowest position, wheels locked, appropriate side rails in place.

## 2018-09-24 NOTE — ED ADULT TRIAGE NOTE - BP NONINVASIVE DIASTOLIC (MM HG)
78 Patient with HbA1c of 6.5%  - FS ranging 160s-200s, given significant debility would not favor tighter control  - c/w Lispro SS

## 2018-09-24 NOTE — ED ADULT NURSE NOTE - OBJECTIVE STATEMENT
Patient  is  alert  and oriented x3.   Color is  good  and  skin warm to touch.  She  is  c/o  severe  rt  sided  chest  pain  with  radiation to  back.  Pain  increased  with deep  inspiration.

## 2018-09-25 VITALS
OXYGEN SATURATION: 99 % | DIASTOLIC BLOOD PRESSURE: 84 MMHG | SYSTOLIC BLOOD PRESSURE: 115 MMHG | HEART RATE: 70 BPM | TEMPERATURE: 98 F | RESPIRATION RATE: 15 BRPM

## 2018-09-25 RX ADMIN — Medication 975 MILLIGRAM(S): at 00:10

## 2018-09-25 NOTE — ED PROVIDER NOTE - CARE PLAN
Principal Discharge DX:	Costochondritis  Assessment and plan of treatment:	1. You were seen for chest pain. A copy of your resulted labs, imaging, and findings have been provided to you.  2. Continue to take your home medications as prescribed.  3. Follow up with GENERAL SURGERY REGARDING THE ENTEROCELE (CALL THE ATTACHED PHONE NUMBER TO MAKE AN APPOINTMENT) AND your primary care doctor within 48 hours. Please call [phone] to make an appointment or with any questions you may have.  4. Return immediately to the emergency department for new, persistent, or worsening symptoms or signs. Return immediately to the emergency department if you have chest pain, shortness of breath, loss of consciousness, fever, abdominal pain, nausea, vomiting, chest pain, or shortness of breath.

## 2018-09-25 NOTE — ED PROVIDER NOTE - OBJECTIVE STATEMENT
43 female w severe chest pain slow onset 4 days ago, tearing to back. no sob, no plerutic no n/v/diaphoresis.

## 2018-09-25 NOTE — ED PROVIDER NOTE - MEDICAL DECISION MAKING DETAILS
given how remarkable symptoms are - concern for dissection. cta chest/abd immediate upon seeing pt. pe much less likely, acs less likely.; pt bp is 120s on arrival - holding b blocker until results.

## 2018-09-25 NOTE — ED PROVIDER NOTE - ATTENDING CONTRIBUTION TO CARE
Dr. DANIEL Israel:  I have independently evaluated the patient and have documented in the appropriate sections above.  I agree with the exam and plan as noted above.

## 2018-09-25 NOTE — ED PROVIDER NOTE - PROGRESS NOTE DETAILS
Cristiane JOSEPH: on reassessment pt had states she felt severe CP s/p toradol and morphine; pt then given flexeril and tylenol, on reassessment now pt states her CP is resolved, RRR, lungs CTAB, abdomen soft ND NT; spoke with surgery regarding pts finding of enterocele which is not noted to be incarcerated by radiology d/w pt need for f/u she states she understands will d/c pt with gen surg and pmd f/u

## 2018-09-25 NOTE — ED PROVIDER NOTE - PHYSICAL EXAMINATION
Gen: well appearing, of stated age, no acute distress; Head: NC, AT; ENT: MMM, no uvular deviation; Neck: supple with full ROM; Chest: CTAB, no retractions, rate normal, appears to breath comfortable; Heart: RRR S1S2 No JVD No peripheral edema b/l pulses 2+ in arms and legs; Abd: Soft non-tender, no rebound or guarding, no masses, no win sign, no mcburney tenderness, no CVAT; Back: No spinal deformity; Ext: Moving all 4 limbs without obvious impairment to ROM, no obvious weakness; Neuro: fluid speech, no focal deficits, oriented to person, place, situation; Psych: No anxiety, depression or pressured speech noted; Skin: no utricaria, no diffuse rash. -ncohen

## 2018-09-25 NOTE — ED PROVIDER NOTE - PLAN OF CARE
1. You were seen for chest pain. A copy of your resulted labs, imaging, and findings have been provided to you.  2. Continue to take your home medications as prescribed.  3. Follow up with GENERAL SURGERY REGARDING THE ENTEROCELE (CALL THE ATTACHED PHONE NUMBER TO MAKE AN APPOINTMENT) AND your primary care doctor within 48 hours. Please call [phone] to make an appointment or with any questions you may have.  4. Return immediately to the emergency department for new, persistent, or worsening symptoms or signs. Return immediately to the emergency department if you have chest pain, shortness of breath, loss of consciousness, fever, abdominal pain, nausea, vomiting, chest pain, or shortness of breath.

## 2018-12-07 ENCOUNTER — APPOINTMENT (OUTPATIENT)
Dept: PAIN MANAGEMENT | Facility: CLINIC | Age: 43
End: 2018-12-07
Payer: COMMERCIAL

## 2018-12-07 VITALS
WEIGHT: 164 LBS | DIASTOLIC BLOOD PRESSURE: 70 MMHG | BODY MASS INDEX: 28 KG/M2 | HEART RATE: 78 BPM | HEIGHT: 64 IN | SYSTOLIC BLOOD PRESSURE: 120 MMHG

## 2018-12-07 PROBLEM — M94.0 CHONDROCOSTAL JUNCTION SYNDROME [TIETZE]: Chronic | Status: ACTIVE | Noted: 2018-09-24

## 2018-12-07 PROCEDURE — 99213 OFFICE O/P EST LOW 20 MIN: CPT

## 2019-01-04 NOTE — ED SUB INTERN NOTE - CARDIOVASCULAR [+], MLM
CHEST PAIN O-L Flap Text: The defect edges were debeveled with a #15 scalpel blade.  Given the location of the defect, shape of the defect and the proximity to free margins an O-L flap was deemed most appropriate.  Using a sterile surgical marker, an appropriate advancement flap was drawn incorporating the defect and placing the expected incisions within the relaxed skin tension lines where possible.    The area thus outlined was incised deep to adipose tissue with a #15 scalpel blade.  The skin margins were undermined to an appropriate distance in all directions utilizing iris scissors.

## 2019-03-07 ENCOUNTER — APPOINTMENT (OUTPATIENT)
Dept: PAIN MANAGEMENT | Facility: CLINIC | Age: 44
End: 2019-03-07
Payer: COMMERCIAL

## 2019-03-07 VITALS
DIASTOLIC BLOOD PRESSURE: 70 MMHG | BODY MASS INDEX: 28.68 KG/M2 | HEART RATE: 76 BPM | SYSTOLIC BLOOD PRESSURE: 120 MMHG | HEIGHT: 64 IN | WEIGHT: 168 LBS

## 2019-03-07 PROCEDURE — 99213 OFFICE O/P EST LOW 20 MIN: CPT

## 2019-03-07 RX ORDER — DOXEPIN HYDROCHLORIDE 10 MG/1
10 CAPSULE ORAL
Qty: 30 | Refills: 5 | Status: DISCONTINUED | COMMUNITY
Start: 2017-11-09 | End: 2019-03-07

## 2019-03-07 RX ORDER — INDOMETHACIN 75 MG/1
75 CAPSULE, EXTENDED RELEASE ORAL
Qty: 60 | Refills: 3 | Status: DISCONTINUED | COMMUNITY
Start: 2018-06-14 | End: 2019-03-07

## 2019-03-07 NOTE — PHYSICAL EXAM
[General Appearance - Alert] : alert [General Appearance - Well-Appearing] : healthy appearing [] : normal voice and communication [Oriented To Time, Place, And Person] : oriented to person, place, and time [Affect] : the affect was normal [Mood] : the mood was normal [Motor Strength] : muscle strength was normal in all four extremities [Sclera] : the sclera and conjunctiva were normal [PERRL With Normal Accommodation] : pupils were equal in size, round, reactive to light, with normal accommodation [Extraocular Movements] : extraocular movements were intact [Edema] : there was no peripheral edema [Abnormal Walk] : normal gait [Paresis Pronator Drift Right-Sided] : no pronator drift on the right [Paresis Pronator Drift Left-Sided] : no pronator drift on the left [Coordination - Dysmetria Impaired Finger-to-Nose Bilateral] : not present

## 2019-03-07 NOTE — REVIEW OF SYSTEMS
[Fever] : no fever [Chills] : no chills [Chest Pain] : no chest pain [Palpitations] : no palpitations [Shortness Of Breath] : no shortness of breath [Easy Bleeding] : no tendency for easy bleeding [Easy Bruising] : no tendency for easy bruising

## 2019-03-07 NOTE — HISTORY OF PRESENT ILLNESS
[Headache] : headache [Nausea] : nausea [Photophobia] : photophobia [FreeTextEntry1] : Pt is here for a follow up visit. \par Reports ' I have only had 1 bad episode since seeing Dr Johnson".\par Does have 'milder" migraines in between..\par Diclofenac and Flexeril are helpful for migraine, Zofran is helpful for nausea. \par \par No new health issues. \par

## 2019-03-18 ENCOUNTER — MESSAGE (OUTPATIENT)
Age: 44
End: 2019-03-18

## 2019-05-17 ENCOUNTER — APPOINTMENT (OUTPATIENT)
Dept: MAMMOGRAPHY | Facility: IMAGING CENTER | Age: 44
End: 2019-05-17
Payer: COMMERCIAL

## 2019-05-17 ENCOUNTER — APPOINTMENT (OUTPATIENT)
Dept: ULTRASOUND IMAGING | Facility: IMAGING CENTER | Age: 44
End: 2019-05-17
Payer: COMMERCIAL

## 2019-05-17 ENCOUNTER — OUTPATIENT (OUTPATIENT)
Dept: OUTPATIENT SERVICES | Facility: HOSPITAL | Age: 44
LOS: 1 days | End: 2019-05-17
Payer: COMMERCIAL

## 2019-05-17 DIAGNOSIS — Z00.00 ENCOUNTER FOR GENERAL ADULT MEDICAL EXAMINATION WITHOUT ABNORMAL FINDINGS: ICD-10-CM

## 2019-05-17 PROCEDURE — 77063 BREAST TOMOSYNTHESIS BI: CPT

## 2019-05-17 PROCEDURE — 77067 SCR MAMMO BI INCL CAD: CPT | Mod: 26

## 2019-05-17 PROCEDURE — 77063 BREAST TOMOSYNTHESIS BI: CPT | Mod: 26

## 2019-05-17 PROCEDURE — 77067 SCR MAMMO BI INCL CAD: CPT

## 2019-06-27 ENCOUNTER — APPOINTMENT (OUTPATIENT)
Dept: ULTRASOUND IMAGING | Facility: CLINIC | Age: 44
End: 2019-06-27
Payer: COMMERCIAL

## 2019-06-27 ENCOUNTER — OUTPATIENT (OUTPATIENT)
Dept: OUTPATIENT SERVICES | Facility: HOSPITAL | Age: 44
LOS: 1 days | End: 2019-06-27
Payer: COMMERCIAL

## 2019-06-27 DIAGNOSIS — N64.4 MASTODYNIA: ICD-10-CM

## 2019-06-27 PROCEDURE — 76641 ULTRASOUND BREAST COMPLETE: CPT

## 2019-06-27 PROCEDURE — 76641 ULTRASOUND BREAST COMPLETE: CPT | Mod: 26,50

## 2019-07-28 ENCOUNTER — EMERGENCY (EMERGENCY)
Facility: HOSPITAL | Age: 44
LOS: 1 days | Discharge: HOME | End: 2019-07-28
Attending: EMERGENCY MEDICINE
Payer: COMMERCIAL

## 2019-07-28 VITALS
HEIGHT: 64 IN | DIASTOLIC BLOOD PRESSURE: 84 MMHG | SYSTOLIC BLOOD PRESSURE: 146 MMHG | WEIGHT: 167.99 LBS | TEMPERATURE: 98 F | RESPIRATION RATE: 98 BRPM | OXYGEN SATURATION: 98 % | HEART RATE: 70 BPM

## 2019-07-28 VITALS
HEART RATE: 67 BPM | RESPIRATION RATE: 18 BRPM | TEMPERATURE: 98 F | DIASTOLIC BLOOD PRESSURE: 81 MMHG | OXYGEN SATURATION: 100 % | SYSTOLIC BLOOD PRESSURE: 139 MMHG

## 2019-07-28 DIAGNOSIS — Z98.890 OTHER SPECIFIED POSTPROCEDURAL STATES: Chronic | ICD-10-CM

## 2019-07-28 LAB
ALBUMIN SERPL ELPH-MCNC: 4.3 G/DL — SIGNIFICANT CHANGE UP (ref 3.3–5)
ALP SERPL-CCNC: 63 U/L — SIGNIFICANT CHANGE UP (ref 40–120)
ALT FLD-CCNC: 19 U/L — SIGNIFICANT CHANGE UP (ref 10–45)
ANION GAP SERPL CALC-SCNC: 12 MMOL/L — SIGNIFICANT CHANGE UP (ref 5–17)
AST SERPL-CCNC: 15 U/L — SIGNIFICANT CHANGE UP (ref 10–40)
BILIRUB SERPL-MCNC: 0.4 MG/DL — SIGNIFICANT CHANGE UP (ref 0.2–1.2)
BUN SERPL-MCNC: 14 MG/DL — SIGNIFICANT CHANGE UP (ref 7–23)
CALCIUM SERPL-MCNC: 9.8 MG/DL — SIGNIFICANT CHANGE UP (ref 8.4–10.5)
CHLORIDE SERPL-SCNC: 106 MMOL/L — SIGNIFICANT CHANGE UP (ref 96–108)
CO2 SERPL-SCNC: 23 MMOL/L — SIGNIFICANT CHANGE UP (ref 22–31)
CREAT SERPL-MCNC: 0.83 MG/DL — SIGNIFICANT CHANGE UP (ref 0.5–1.3)
GAS PNL BLDV: SIGNIFICANT CHANGE UP
GLUCOSE SERPL-MCNC: 107 MG/DL — HIGH (ref 70–99)
HCT VFR BLD CALC: 41.1 % — SIGNIFICANT CHANGE UP (ref 34.5–45)
HGB BLD-MCNC: 13.6 G/DL — SIGNIFICANT CHANGE UP (ref 11.5–15.5)
MCHC RBC-ENTMCNC: 29.6 PG — SIGNIFICANT CHANGE UP (ref 27–34)
MCHC RBC-ENTMCNC: 33.1 GM/DL — SIGNIFICANT CHANGE UP (ref 32–36)
MCV RBC AUTO: 89.4 FL — SIGNIFICANT CHANGE UP (ref 80–100)
PLATELET # BLD AUTO: 325 K/UL — SIGNIFICANT CHANGE UP (ref 150–400)
POTASSIUM SERPL-MCNC: 3.7 MMOL/L — SIGNIFICANT CHANGE UP (ref 3.5–5.3)
POTASSIUM SERPL-SCNC: 3.7 MMOL/L — SIGNIFICANT CHANGE UP (ref 3.5–5.3)
PROT SERPL-MCNC: 6.9 G/DL — SIGNIFICANT CHANGE UP (ref 6–8.3)
RBC # BLD: 4.6 M/UL — SIGNIFICANT CHANGE UP (ref 3.8–5.2)
RBC # FLD: 11.3 % — SIGNIFICANT CHANGE UP (ref 10.3–14.5)
SODIUM SERPL-SCNC: 141 MMOL/L — SIGNIFICANT CHANGE UP (ref 135–145)
TROPONIN T, HIGH SENSITIVITY RESULT: <6 NG/L — SIGNIFICANT CHANGE UP (ref 0–51)
WBC # BLD: 9.1 K/UL — SIGNIFICANT CHANGE UP (ref 3.8–10.5)
WBC # FLD AUTO: 9.1 K/UL — SIGNIFICANT CHANGE UP (ref 3.8–10.5)

## 2019-07-28 PROCEDURE — 84132 ASSAY OF SERUM POTASSIUM: CPT

## 2019-07-28 PROCEDURE — 85027 COMPLETE CBC AUTOMATED: CPT

## 2019-07-28 PROCEDURE — 93308 TTE F-UP OR LMTD: CPT

## 2019-07-28 PROCEDURE — 96374 THER/PROPH/DIAG INJ IV PUSH: CPT

## 2019-07-28 PROCEDURE — 80053 COMPREHEN METABOLIC PANEL: CPT

## 2019-07-28 PROCEDURE — 82565 ASSAY OF CREATININE: CPT

## 2019-07-28 PROCEDURE — 93010 ELECTROCARDIOGRAM REPORT: CPT

## 2019-07-28 PROCEDURE — 71046 X-RAY EXAM CHEST 2 VIEWS: CPT | Mod: 26

## 2019-07-28 PROCEDURE — 82330 ASSAY OF CALCIUM: CPT

## 2019-07-28 PROCEDURE — 71046 X-RAY EXAM CHEST 2 VIEWS: CPT

## 2019-07-28 PROCEDURE — 99285 EMERGENCY DEPT VISIT HI MDM: CPT | Mod: 25

## 2019-07-28 PROCEDURE — 83605 ASSAY OF LACTIC ACID: CPT

## 2019-07-28 PROCEDURE — 85014 HEMATOCRIT: CPT

## 2019-07-28 PROCEDURE — 94640 AIRWAY INHALATION TREATMENT: CPT

## 2019-07-28 PROCEDURE — 84295 ASSAY OF SERUM SODIUM: CPT

## 2019-07-28 PROCEDURE — 99284 EMERGENCY DEPT VISIT MOD MDM: CPT | Mod: 25

## 2019-07-28 PROCEDURE — 82947 ASSAY GLUCOSE BLOOD QUANT: CPT

## 2019-07-28 PROCEDURE — 82435 ASSAY OF BLOOD CHLORIDE: CPT

## 2019-07-28 PROCEDURE — 93005 ELECTROCARDIOGRAM TRACING: CPT

## 2019-07-28 PROCEDURE — 84484 ASSAY OF TROPONIN QUANT: CPT

## 2019-07-28 PROCEDURE — 93308 TTE F-UP OR LMTD: CPT | Mod: 26

## 2019-07-28 PROCEDURE — 82803 BLOOD GASES ANY COMBINATION: CPT

## 2019-07-28 RX ORDER — IPRATROPIUM/ALBUTEROL SULFATE 18-103MCG
3 AEROSOL WITH ADAPTER (GRAM) INHALATION ONCE
Refills: 0 | Status: COMPLETED | OUTPATIENT
Start: 2019-07-28 | End: 2019-07-28

## 2019-07-28 RX ORDER — KETOROLAC TROMETHAMINE 30 MG/ML
15 SYRINGE (ML) INJECTION ONCE
Refills: 0 | Status: DISCONTINUED | OUTPATIENT
Start: 2019-07-28 | End: 2019-07-28

## 2019-07-28 RX ADMIN — Medication 60 MILLIGRAM(S): at 06:20

## 2019-07-28 RX ADMIN — Medication 15 MILLIGRAM(S): at 05:15

## 2019-07-28 RX ADMIN — Medication 3 MILLILITER(S): at 05:31

## 2019-07-28 RX ADMIN — Medication 15 MILLIGRAM(S): at 05:32

## 2019-07-28 NOTE — ED ADULT NURSE NOTE - CAS DISCH CONDITION
Pt reports pain relief before discharge. Pt appears to be more comfortable prior to going home/Stable
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
## 2019-07-28 NOTE — ED PROVIDER NOTE - CLINICAL SUMMARY MEDICAL DECISION MAKING FREE TEXT BOX
43F h/o costochondritis p/w chest pain x1 week radiating to the back associated with SOB. HD normal. Exam WNL. Likely aspiration pneumonitis after swallowing water. Chest pain pleuritic, but less likely costochondritis, PE or pericarditis.   - F/u EKG, troponin, labs, CXR  - Pain control  - Will consider increasing dose of prednisone to 60mg with plan to taper for aspiration pneumonitis and c/w inhaler and amoxicillin

## 2019-07-28 NOTE — ED PROVIDER NOTE - CONSTITUTIONAL, MLM
normal... Appears uncomfortable, well nourished, awake, alert, oriented to person, place, time/situation

## 2019-07-28 NOTE — ED ADULT NURSE REASSESSMENT NOTE - NS ED NURSE REASSESS COMMENT FT1
Pt appears to be more comfortable in bed. Pt reports the pain has subsided at this time. MD Contreras at bedside with ultrasound. Safety and comfort measures maintained. Awaiting dispo from MD.

## 2019-07-28 NOTE — ED PROVIDER NOTE - NSFOLLOWUPINSTRUCTIONS_ED_ALL_ED_FT
You were evaluated in the ER at [facility] for chest pain and shortness of breath. Your labs and chest X ray were normal.     - If you develop worsening chest pain, shortness of breath, nausea/vomiting, fevers/chills, lightheadedness, or any new or worsening symptoms, please return to the ER for further evaluation.     - Please continue to take prednisone, amoxicillin and albuterol nebulizers as prescribed    - Please follow up with your primary care physician when your steroid course is completed You were evaluated in the ER at [facility] for chest pain and shortness of breath. Your labs and chest X ray were normal.     - If you develop worsening chest pain, shortness of breath, nausea/vomiting, fevers/chills, lightheadedness, or any new or worsening symptoms, please return to the ER for further evaluation.     - Please continue to take prednisone, amoxicillin and albuterol nebulizers as prescribed    - Please follow up with your primary care physician when your prednisone course is completed
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
## 2019-09-06 ENCOUNTER — APPOINTMENT (OUTPATIENT)
Dept: PAIN MANAGEMENT | Facility: CLINIC | Age: 44
End: 2019-09-06
Payer: COMMERCIAL

## 2019-09-06 VITALS
HEART RATE: 82 BPM | DIASTOLIC BLOOD PRESSURE: 83 MMHG | BODY MASS INDEX: 28.68 KG/M2 | SYSTOLIC BLOOD PRESSURE: 119 MMHG | WEIGHT: 168 LBS | HEIGHT: 64 IN

## 2019-09-06 PROCEDURE — 99213 OFFICE O/P EST LOW 20 MIN: CPT

## 2019-09-06 NOTE — ASSESSMENT
[FreeTextEntry1] : Migraine - Naratriptan Bridge\par  RTO 8 weeks \par \par Dr Johnson on site - billed incident to service.

## 2019-09-06 NOTE — PHYSICAL EXAM
[General Appearance - Well-Appearing] : healthy appearing [General Appearance - Alert] : alert [] : normal voice and communication [Oriented To Time, Place, And Person] : oriented to person, place, and time [Affect] : the affect was normal [Mood] : the mood was normal [Cranial Nerves Facial (VII)] : face symmetrical [Cranial Nerves Accessory (XI - Cranial And Spinal)] : head turning and shoulder shrug symmetric [Motor Strength] : muscle strength was normal in all four extremities [Cranial Nerves Hypoglossal (XII)] : there was no tongue deviation with protrusion [Sclera] : the sclera and conjunctiva were normal [PERRL With Normal Accommodation] : pupils were equal in size, round, reactive to light, with normal accommodation [Extraocular Movements] : extraocular movements were intact [Edema] : there was no peripheral edema [Abnormal Walk] : normal gait [Paresis Pronator Drift Left-Sided] : no pronator drift on the left [Paresis Pronator Drift Right-Sided] : no pronator drift on the right [Motor Strength Lower Extremities Bilaterally] : strength was normal in both lower extremities [Motor Strength Upper Extremities Bilaterally] : strength was normal in both upper extremities

## 2019-09-06 NOTE — HISTORY OF PRESENT ILLNESS
[Headache] : headache [Nausea] : nausea [Photophobia] : photophobia [Phonophobia] : phonophobia [Daily] : daily [FreeTextEntry1] : Patient returns today for a followup visit . Reports she was doing well until her sister passed away in August .\par For the past 4 weeks has had daily migraine . \par Has been taking Diclofenac , Flexeril and Zofran - which provides temporary relief, but the migraine returns the next day. \par \par In July patient had pleuritis after she spent the day  at [employer]  . \par She was prescribed Augmentin , Prednisone and an inhaler.

## 2019-09-12 NOTE — ED SUB INTERN NOTE - RESPIRATORY CHEST EXAM
1.  Has the patient had a previous reaction to IV contrast? no    2.  Does the patient have kidney disease? no    3.  Is the patient on dialysis? no    If YES to any of these questions, exam will be reviewed with a Radiologist before administering contrast.      
IV Contrast- Discharge Instructions After Your CT Scan      The IV contrast you received today will be filtered from your bloodstream by your kidneys during the next 24 hours and pass from the body in urine.  You will not be aware of this process and your urine will not change in color.  To help this process you should drink at least 4 additional glasses of water or juice today.  This reduces stress on your kidneys.    Most contrast reactions are immediate.  Should you develop symptoms of concern after discharge, contact the department at the number below.  After hours you should contact your personal physician.  If you develop breathing distress or wheezing, call 911.        
STERNUM TENDERNESS

## 2019-11-01 ENCOUNTER — APPOINTMENT (OUTPATIENT)
Dept: PAIN MANAGEMENT | Facility: CLINIC | Age: 44
End: 2019-11-01

## 2020-02-04 ENCOUNTER — EMERGENCY (EMERGENCY)
Facility: HOSPITAL | Age: 45
LOS: 1 days | Discharge: HOME | End: 2020-02-04
Attending: EMERGENCY MEDICINE
Payer: COMMERCIAL

## 2020-02-04 VITALS
WEIGHT: 160.06 LBS | SYSTOLIC BLOOD PRESSURE: 126 MMHG | RESPIRATION RATE: 16 BRPM | HEIGHT: 64 IN | HEART RATE: 105 BPM | DIASTOLIC BLOOD PRESSURE: 79 MMHG | OXYGEN SATURATION: 98 % | TEMPERATURE: 99 F

## 2020-02-04 DIAGNOSIS — Z98.890 OTHER SPECIFIED POSTPROCEDURAL STATES: Chronic | ICD-10-CM

## 2020-02-04 LAB
ALBUMIN SERPL ELPH-MCNC: 4.7 G/DL — SIGNIFICANT CHANGE UP (ref 3.3–5)
ALP SERPL-CCNC: 68 U/L — SIGNIFICANT CHANGE UP (ref 40–120)
ALT FLD-CCNC: 14 U/L — SIGNIFICANT CHANGE UP (ref 10–45)
ANION GAP SERPL CALC-SCNC: 13 MMOL/L — SIGNIFICANT CHANGE UP (ref 5–17)
APTT BLD: 34.5 SEC — SIGNIFICANT CHANGE UP (ref 27.5–36.3)
AST SERPL-CCNC: 16 U/L — SIGNIFICANT CHANGE UP (ref 10–40)
BASOPHILS # BLD AUTO: 0.01 K/UL — SIGNIFICANT CHANGE UP (ref 0–0.2)
BASOPHILS NFR BLD AUTO: 0.2 % — SIGNIFICANT CHANGE UP (ref 0–2)
BILIRUB SERPL-MCNC: 0.9 MG/DL — SIGNIFICANT CHANGE UP (ref 0.2–1.2)
BUN SERPL-MCNC: 6 MG/DL — LOW (ref 7–23)
CALCIUM SERPL-MCNC: 10.2 MG/DL — SIGNIFICANT CHANGE UP (ref 8.4–10.5)
CHLORIDE SERPL-SCNC: 102 MMOL/L — SIGNIFICANT CHANGE UP (ref 96–108)
CO2 SERPL-SCNC: 25 MMOL/L — SIGNIFICANT CHANGE UP (ref 22–31)
CREAT SERPL-MCNC: 1.02 MG/DL — SIGNIFICANT CHANGE UP (ref 0.5–1.3)
EOSINOPHIL # BLD AUTO: 0.01 K/UL — SIGNIFICANT CHANGE UP (ref 0–0.5)
EOSINOPHIL NFR BLD AUTO: 0.2 % — SIGNIFICANT CHANGE UP (ref 0–6)
FLU A RESULT: SIGNIFICANT CHANGE UP
FLU A RESULT: SIGNIFICANT CHANGE UP
FLUAV AG NPH QL: SIGNIFICANT CHANGE UP
FLUBV AG NPH QL: SIGNIFICANT CHANGE UP
GLUCOSE SERPL-MCNC: 89 MG/DL — SIGNIFICANT CHANGE UP (ref 70–99)
HCT VFR BLD CALC: 42.2 % — SIGNIFICANT CHANGE UP (ref 34.5–45)
HGB BLD-MCNC: 13.6 G/DL — SIGNIFICANT CHANGE UP (ref 11.5–15.5)
IMM GRANULOCYTES NFR BLD AUTO: 0.2 % — SIGNIFICANT CHANGE UP (ref 0–1.5)
INR BLD: 1.08 RATIO — SIGNIFICANT CHANGE UP (ref 0.88–1.16)
LYMPHOCYTES # BLD AUTO: 1.27 K/UL — SIGNIFICANT CHANGE UP (ref 1–3.3)
LYMPHOCYTES # BLD AUTO: 21.9 % — SIGNIFICANT CHANGE UP (ref 13–44)
MCHC RBC-ENTMCNC: 28.3 PG — SIGNIFICANT CHANGE UP (ref 27–34)
MCHC RBC-ENTMCNC: 32.2 GM/DL — SIGNIFICANT CHANGE UP (ref 32–36)
MCV RBC AUTO: 87.7 FL — SIGNIFICANT CHANGE UP (ref 80–100)
MONOCYTES # BLD AUTO: 0.43 K/UL — SIGNIFICANT CHANGE UP (ref 0–0.9)
MONOCYTES NFR BLD AUTO: 7.4 % — SIGNIFICANT CHANGE UP (ref 2–14)
NEUTROPHILS # BLD AUTO: 4.07 K/UL — SIGNIFICANT CHANGE UP (ref 1.8–7.4)
NEUTROPHILS NFR BLD AUTO: 70.1 % — SIGNIFICANT CHANGE UP (ref 43–77)
NRBC # BLD: 0 /100 WBCS — SIGNIFICANT CHANGE UP (ref 0–0)
PLATELET # BLD AUTO: 285 K/UL — SIGNIFICANT CHANGE UP (ref 150–400)
POTASSIUM SERPL-MCNC: 3.4 MMOL/L — LOW (ref 3.5–5.3)
POTASSIUM SERPL-SCNC: 3.4 MMOL/L — LOW (ref 3.5–5.3)
PROT SERPL-MCNC: 8.2 G/DL — SIGNIFICANT CHANGE UP (ref 6–8.3)
PROTHROM AB SERPL-ACNC: 12.5 SEC — SIGNIFICANT CHANGE UP (ref 10–12.9)
RBC # BLD: 4.81 M/UL — SIGNIFICANT CHANGE UP (ref 3.8–5.2)
RBC # FLD: 11.7 % — SIGNIFICANT CHANGE UP (ref 10.3–14.5)
RSV RESULT: SIGNIFICANT CHANGE UP
RSV RNA RESP QL NAA+PROBE: SIGNIFICANT CHANGE UP
SODIUM SERPL-SCNC: 140 MMOL/L — SIGNIFICANT CHANGE UP (ref 135–145)
WBC # BLD: 5.8 K/UL — SIGNIFICANT CHANGE UP (ref 3.8–10.5)
WBC # FLD AUTO: 5.8 K/UL — SIGNIFICANT CHANGE UP (ref 3.8–10.5)

## 2020-02-04 PROCEDURE — 71275 CT ANGIOGRAPHY CHEST: CPT | Mod: 26

## 2020-02-04 PROCEDURE — 93010 ELECTROCARDIOGRAM REPORT: CPT

## 2020-02-04 PROCEDURE — 71046 X-RAY EXAM CHEST 2 VIEWS: CPT | Mod: 26

## 2020-02-04 PROCEDURE — 99285 EMERGENCY DEPT VISIT HI MDM: CPT

## 2020-02-04 RX ORDER — ACETAMINOPHEN 500 MG
975 TABLET ORAL ONCE
Refills: 0 | Status: COMPLETED | OUTPATIENT
Start: 2020-02-04 | End: 2020-02-04

## 2020-02-04 RX ORDER — SODIUM CHLORIDE 9 MG/ML
1000 INJECTION INTRAMUSCULAR; INTRAVENOUS; SUBCUTANEOUS ONCE
Refills: 0 | Status: COMPLETED | OUTPATIENT
Start: 2020-02-04 | End: 2020-02-04

## 2020-02-04 RX ORDER — KETOROLAC TROMETHAMINE 30 MG/ML
15 SYRINGE (ML) INJECTION ONCE
Refills: 0 | Status: DISCONTINUED | OUTPATIENT
Start: 2020-02-04 | End: 2020-02-04

## 2020-02-04 RX ADMIN — SODIUM CHLORIDE 1000 MILLILITER(S): 9 INJECTION INTRAMUSCULAR; INTRAVENOUS; SUBCUTANEOUS at 21:20

## 2020-02-04 RX ADMIN — Medication 975 MILLIGRAM(S): at 21:20

## 2020-02-04 RX ADMIN — Medication 15 MILLIGRAM(S): at 21:20

## 2020-02-04 NOTE — ED ADULT NURSE NOTE - NSIMPLEMENTINTERV_GEN_ALL_ED
Implemented All Universal Safety Interventions:  [place] to call system. Call bell, personal items and telephone within reach. Instruct patient to call for assistance. Room bathroom lighting operational. Non-slip footwear when patient is off stretcher. Physically safe environment: no spills, clutter or unnecessary equipment. Stretcher in lowest position, wheels locked, appropriate side rails in place.

## 2020-02-04 NOTE — ED ADULT NURSE NOTE - OBJECTIVE STATEMENT
Patient is a 44y female presenting to the ED ambulatory from home with c/o fever. Patient A&Ox4. Patient reports having fever and chills starting earlier today accompanied by weakness and lethargy. Reports 2 hours ago she began having 5/10 mid-sternal chest pain she describes at "chest tightness." Reports having difficulty breathing with the pain. Patient is a 44y female presenting to the ED ambulatory from home with c/o fever. Patient A&Ox4. Patient reports having headache, fever, and chills starting earlier today accompanied by weakness and lethargy. Reports 2 hours ago she began having 5/10 mid-sternal chest pain she describes at "chest tightness." Reports having difficulty breathing with the pain and the pain worsens when taking a deep breath. Heart sounds heard upon auscultation. Lung sounds clear bilaterally. Denies N/V/D, abdominal pain, fever/chills, burning upon urination or difficulty urinating, hematuria. PMH of migraines.

## 2020-02-05 VITALS
RESPIRATION RATE: 18 BRPM | SYSTOLIC BLOOD PRESSURE: 106 MMHG | TEMPERATURE: 98 F | DIASTOLIC BLOOD PRESSURE: 68 MMHG | OXYGEN SATURATION: 99 % | HEART RATE: 72 BPM

## 2020-02-05 PROCEDURE — 83690 ASSAY OF LIPASE: CPT

## 2020-02-05 PROCEDURE — 85610 PROTHROMBIN TIME: CPT

## 2020-02-05 PROCEDURE — 71275 CT ANGIOGRAPHY CHEST: CPT

## 2020-02-05 PROCEDURE — 80053 COMPREHEN METABOLIC PANEL: CPT

## 2020-02-05 PROCEDURE — 85027 COMPLETE CBC AUTOMATED: CPT

## 2020-02-05 PROCEDURE — 71046 X-RAY EXAM CHEST 2 VIEWS: CPT

## 2020-02-05 PROCEDURE — 85730 THROMBOPLASTIN TIME PARTIAL: CPT

## 2020-02-05 PROCEDURE — 84484 ASSAY OF TROPONIN QUANT: CPT

## 2020-02-05 PROCEDURE — 96374 THER/PROPH/DIAG INJ IV PUSH: CPT | Mod: XU

## 2020-02-05 PROCEDURE — 99284 EMERGENCY DEPT VISIT MOD MDM: CPT | Mod: 25

## 2020-02-05 PROCEDURE — 87631 RESP VIRUS 3-5 TARGETS: CPT

## 2020-02-05 PROCEDURE — 93005 ELECTROCARDIOGRAM TRACING: CPT

## 2020-02-05 RX ORDER — OXYCODONE HYDROCHLORIDE 5 MG/1
5 TABLET ORAL ONCE
Refills: 0 | Status: DISCONTINUED | OUTPATIENT
Start: 2020-02-05 | End: 2020-02-05

## 2020-02-05 RX ADMIN — OXYCODONE HYDROCHLORIDE 5 MILLIGRAM(S): 5 TABLET ORAL at 00:53

## 2020-02-05 NOTE — ED PROVIDER NOTE - PATIENT PORTAL LINK FT
You can access the FollowMyHealth Patient Portal offered by [facility] by registering at the following website: [email or web address]. By joining [employer]’s FollowMyHealth portal, you will also be able to view your health information using other applications (apps) compatible with our system.

## 2020-02-05 NOTE — ED PROVIDER NOTE - NSFOLLOWUPINSTRUCTIONS_ED_ALL_ED_FT
You were seen and evaluated in the emergency department for headache and body aches. We did not find evidence for a dangerous cause of your pain. This does not mean your pain is not real or concerning, only that we could not find a dangerous or life-threatening cause. Please read the attached information sheets as they will provide useful information regarding your condition.    You may take up to 600mg of ibuprofen (Motrin, Advil) every 6 hours as needed for pain. Please take ibuprofen with food if possible to prevent stomach upset. You may also take up to 1000mg of acetaminophen (Tylenol) every 6 hours as needed for pain.     It is important to understand that while your workup was reassuring, no medical workup can completely exclude all concerning conditions. Therefore, we ask that you please return if you develop new or worsening pain, trouble breathing, fainting (or feel that you might faint), weakness, inability to perform your daily activities, or other concerning new symptoms (such as listed on the attached information sheets. Please read the attached information sheets. Take your medication as prescribed.    Please be sure to follow up with your regular doctor in the next 3-4 days.   Seek immediate medical care for any new/worsening signs or symptoms.

## 2020-02-05 NOTE — ED PROVIDER NOTE - PROGRESS NOTE DETAILS
labs WNL, trop < 6, sx control for fever and HA with some relief, though still complaining of chest pain,  worse upon deep breath, pending CTa results  Dory Fong, PGY-3 EM Cta without PE, pt overall reports feeling better, tolerating PO, went over results with patient as well as return precautions, hd stable and ok for discharge  Dory Fong, PGY-3 EM   Dory Fong, PGY-3 EM

## 2020-02-05 NOTE — ED PROVIDER NOTE - OBJECTIVE STATEMENT
45 yo F hx of migraines presents with headache and myalgias. Pt reports symptoms started roughly 24 hours ago. Was in usual state of health prior to this. No recent illnesses or travel. Reports body aches, headache, as well as chest pain. CP worse with deep breath. Denies similar pain in the past. Did not take any OTC medications at home. No hx of clots. Not on OCPs.

## 2020-02-05 NOTE — ED PROVIDER NOTE - NS ED ROS FT
General: +fevers / chills, +myalgias  HENT: No head trauma, ear pain, runny nose, or sore throat  Eyes: No visual changes  CP: +chest pain, +palpitations, or lightheadedness  Resp: No shortness of breath, no cough  GI: No abdominal pain, diarrhea, constipation, nausea, or vomiting  : No urinary fz, dysuria, or hematuria  Neuro: +headache, No numbness, tingling, or weakness

## 2020-02-05 NOTE — ED PROVIDER NOTE - PHYSICAL EXAMINATION
Gen: tired/fatigued appearing, though non-toxic, conversational  Eyes: PERRLA, EOMI   HENT: Normocephalic, atraumatic. External ears normal, no rhinorrhea, moist mucous membranes.   CV: RRR, no M/R/G  Resp: CTAB, non-labored, speaking without difficulty on room air  Abd: soft, non tender, non rigid, no guarding or rebound tenderness  Skin: dry, wwp   Neuro: AOx3, speech is fluent and appropriate

## 2020-02-05 NOTE — ED PROVIDER NOTE - NS ED MD EM SELECTION
Called patient and chart review was done. Patient was advised per- Dr. Colin Steel if patient neck, back, and headaches has improved. Patient stated his back and neck has not improved. Patient was advised per-order Dr. Colin Steel that he will be sent to physical therapy and they will call him to set up a time and date. [id number] Exp Problem Focused - Mod. Complex

## 2020-02-05 NOTE — ED PROVIDER NOTE - ATTENDING CONTRIBUTION TO CARE
44F w/ PMHx as above presenting to the ED w/ headaches, bodyaches, chills, states that it started 24 horus prior. Patient states she was fine before this. Reports no recent travel or sick contacts. Patient states that the headache is dull, non-radiating, mild, denies any exacerbating/alleviating factors, feels like fullness. Patient states that the chest pain is mildly exacerbated by taking a deep breath, alleviated by relaxing, states that she has not taken anything for pain. Denies any family history of sudden cardiac death, denies any cardiac history. Denies OCP use.    PHYSICAL EXAMINATION:  VITALS REVIEWED.  VS normal  GENERALIZED APPEARANCE:  Comfortable, no acute distress, ambulating without difficulty  SKIN:  Warm, dry, no cyanosis  HEAD:  No obvious scalp lesions  EYES:  Conjunctiva pink, no icterus  ENMT:  Mucus membranes moist, no stridor  NECK:  Supple, non-tender  CHEST AND RESPIRATORY:  Clear to auscultation B/L, good air entry B/L, equal chest expansion  HEART AND CARDIOVASCULAR:  Regular rate, no obvious murmur  ABDOMEN AND GI:  Soft, non-tender, non-distended.  No rebound, no guarding, no CVA-area tenderness  EXTREMITIES:  No deformity, edema, or calf tenderness  NEURO: AAOx3, gross motor and sensory intact, CN2-12 intact, cerebellar exam (F2n, GRADY WNL), normal gait  PSYCH: Normal affect     Impression:  URI/viral syndrome; r/o ACS, no signs of pericarditis on EKG, r/o PE/effusion/PNA  Labs, imaging, pain management, IVF, re-eval

## 2020-02-05 NOTE — ED PROVIDER NOTE - CLINICAL SUMMARY MEDICAL DECISION MAKING FREE TEXT BOX
Pt p/w headache, myalgias, likely viral/flu in origin- overall appears tired but non-toxic, tachy to 105, febrile here, labs including flu pending, IVF, tylenol, reassess  Dory Fong, PGY-3 EM

## 2020-05-05 ENCOUNTER — TRANSCRIPTION ENCOUNTER (OUTPATIENT)
Age: 45
End: 2020-05-05

## 2020-05-06 ENCOUNTER — APPOINTMENT (OUTPATIENT)
Dept: PAIN MANAGEMENT | Facility: CLINIC | Age: 45
End: 2020-05-06
Payer: COMMERCIAL

## 2020-05-06 PROCEDURE — 99213 OFFICE O/P EST LOW 20 MIN: CPT | Mod: 95

## 2020-05-06 NOTE — PHYSICAL EXAM
[General Appearance - Alert] : alert [General Appearance - In No Acute Distress] : in no acute distress [General Appearance - Well Nourished] : well nourished [General Appearance - Well Developed] : well developed [General Appearance - Well-Appearing] : healthy appearing [Oriented To Time, Place, And Person] : oriented to person, place, and time [Impaired Insight] : insight and judgment were intact [Affect] : the affect was normal [Mood] : the mood was normal [Memory Recent] : recent memory was not impaired [Memory Remote] : remote memory was not impaired [Person] : oriented to person [Place] : oriented to place [Time] : oriented to time [Short Term Intact] : short term memory intact [Remote Intact] : remote memory intact [Registration Intact] : recent registration memory intact [Concentration Intact] : normal concentrating ability [Naming Objects] : no difficulty naming common objects [Fluency] : fluency intact [Comprehension] : comprehension intact [Current Events] : adequate knowledge of current events [Past History] : adequate knowledge of personal past history [Vocabulary] : adequate range of vocabulary [Cranial Nerves Oculomotor (III)] : extraocular motion intact [Cranial Nerves Facial (VII)] : face symmetrical [Cranial Nerves Vestibulocochlear (VIII)] : hearing was intact bilaterally [Cranial Nerves Accessory (XI - Cranial And Spinal)] : head turning and shoulder shrug symmetric [Cranial Nerves Hypoglossal (XII)] : there was no tongue deviation with protrusion [No Muscle Atrophy] : normal bulk in all four extremities [Motor Handedness Right-Handed] : the patient is right hand dominant [Tremor] : no tremor present [Dysdiadochokinesia Bilaterally] : not present [Sclera] : the sclera and conjunctiva were normal [No VICK] : no internuclear ophthalmoplegia [Outer Ear] : the ears and nose were normal in appearance [Neck Cervical Mass (___cm)] : no neck mass was observed [Exaggerated Use Of Accessory Muscles For Inspiration] : no accessory muscle use [Involuntary Movements] : no involuntary movements were seen [Skin Color & Pigmentation] : normal skin color and pigmentation [] : no rash

## 2020-05-06 NOTE — HISTORY OF PRESENT ILLNESS
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  Had started with headache about 1 month ago and that worsened with her having ot care for mother.\par Has had nausea that has progressively worsened.  Did try her acute meds, took them and nausea got auctely worsened as well.\par Had gone back to the diclofenac/ flexeril.\par Does have the allodynia which gets progressive as well.\par Does get some watering of eye and feeling of cloudiness with tearing.\par  [Headache] : headache [Nausea] : nausea [Photophobia] : photophobia [Phonophobia] : phonophobia [Scalp Tenderness] : scalp tenderness [> 4 hours] : > 4 hours [Worsened] : The patient reports ~his/her~ symptoms since the last visit have worsened [de-identified] : variable but worsened

## 2020-05-06 NOTE — ASSESSMENT
[FreeTextEntry1] : would change zofran to 8mg and if ineffective to go to metoclopramide 10mg prn\par renew diclofenac sodium 75mg bid prn\par renew flexeril 10m g q8 prn\par consider use of ubrelvy or nurtec\par f/u in 3 months\par we spent 20 minutes discussing her care.

## 2020-05-06 NOTE — REVIEW OF SYSTEMS
[Fever] : no fever [Chills] : no chills [Feeling Poorly] : feeling poorly [Feeling Tired] : feeling tired [Eye Pain] : eye pain [Eyesight Problems] : no eyesight problems [Discharge From Eyes] : purulent discharge from the eyes [Chest Pain] : no chest pain [Palpitations] : no palpitations [Cough] : no cough [Arthralgias] : arthralgias [Neck Pain] : neck pain [Lower Back Pain] : no lower back pain [Skin Lesions] : no skin lesions [Skin Wound] : no skin wound [Itching] : no itching [Dizziness] : no dizziness [Sleep Disturbances] : sleep disturbances [Muscle Weakness] : no muscle weakness

## 2020-07-09 ENCOUNTER — APPOINTMENT (OUTPATIENT)
Dept: RADIOLOGY | Facility: IMAGING CENTER | Age: 45
End: 2020-07-09
Payer: COMMERCIAL

## 2020-07-09 ENCOUNTER — APPOINTMENT (OUTPATIENT)
Dept: MAMMOGRAPHY | Facility: IMAGING CENTER | Age: 45
End: 2020-07-09
Payer: COMMERCIAL

## 2020-07-09 ENCOUNTER — OUTPATIENT (OUTPATIENT)
Dept: OUTPATIENT SERVICES | Facility: HOSPITAL | Age: 45
LOS: 1 days | End: 2020-07-09
Payer: COMMERCIAL

## 2020-07-09 DIAGNOSIS — Z00.8 ENCOUNTER FOR OTHER GENERAL EXAMINATION: ICD-10-CM

## 2020-07-09 DIAGNOSIS — Z98.890 OTHER SPECIFIED POSTPROCEDURAL STATES: Chronic | ICD-10-CM

## 2020-07-09 PROCEDURE — 77067 SCR MAMMO BI INCL CAD: CPT | Mod: 26

## 2020-07-09 PROCEDURE — 73030 X-RAY EXAM OF SHOULDER: CPT | Mod: 26,RT

## 2020-07-09 PROCEDURE — 77063 BREAST TOMOSYNTHESIS BI: CPT | Mod: 26

## 2020-07-09 PROCEDURE — 77080 DXA BONE DENSITY AXIAL: CPT | Mod: 26

## 2020-07-09 PROCEDURE — 77080 DXA BONE DENSITY AXIAL: CPT

## 2020-07-09 PROCEDURE — 73030 X-RAY EXAM OF SHOULDER: CPT

## 2020-07-09 PROCEDURE — 77063 BREAST TOMOSYNTHESIS BI: CPT

## 2020-07-09 PROCEDURE — 77067 SCR MAMMO BI INCL CAD: CPT

## 2020-07-23 ENCOUNTER — APPOINTMENT (OUTPATIENT)
Dept: MRI IMAGING | Facility: CLINIC | Age: 45
End: 2020-07-23

## 2020-08-06 ENCOUNTER — APPOINTMENT (OUTPATIENT)
Dept: ORTHOPEDIC SURGERY | Facility: CLINIC | Age: 45
End: 2020-08-06
Payer: COMMERCIAL

## 2020-08-06 VITALS
DIASTOLIC BLOOD PRESSURE: 74 MMHG | BODY MASS INDEX: 27.31 KG/M2 | WEIGHT: 160 LBS | TEMPERATURE: 97.8 F | SYSTOLIC BLOOD PRESSURE: 110 MMHG | HEART RATE: 74 BPM | HEIGHT: 64 IN

## 2020-08-06 PROCEDURE — 20550 NJX 1 TENDON SHEATH/LIGAMENT: CPT | Mod: F6

## 2020-08-06 PROCEDURE — 99203 OFFICE O/P NEW LOW 30 MIN: CPT | Mod: 25

## 2020-08-07 ENCOUNTER — APPOINTMENT (OUTPATIENT)
Dept: OTOLARYNGOLOGY | Facility: CLINIC | Age: 45
End: 2020-08-07
Payer: COMMERCIAL

## 2020-08-07 VITALS
BODY MASS INDEX: 27.31 KG/M2 | HEIGHT: 64 IN | SYSTOLIC BLOOD PRESSURE: 112 MMHG | DIASTOLIC BLOOD PRESSURE: 76 MMHG | TEMPERATURE: 97.9 F | WEIGHT: 160 LBS | HEART RATE: 77 BPM

## 2020-08-07 DIAGNOSIS — Z78.9 OTHER SPECIFIED HEALTH STATUS: ICD-10-CM

## 2020-08-07 DIAGNOSIS — J38.4 EDEMA OF LARYNX: ICD-10-CM

## 2020-08-07 DIAGNOSIS — H69.83 OTHER SPECIFIED DISORDERS OF EUSTACHIAN TUBE, BILATERAL: ICD-10-CM

## 2020-08-07 DIAGNOSIS — H92.03 OTALGIA, BILATERAL: ICD-10-CM

## 2020-08-07 DIAGNOSIS — Z01.10 ENCOUNTER FOR EXAMINATION OF EARS AND HEARING W/OUT ABNORMAL FINDINGS: ICD-10-CM

## 2020-08-07 PROCEDURE — 31231 NASAL ENDOSCOPY DX: CPT

## 2020-08-07 PROCEDURE — G0268 REMOVAL OF IMPACTED WAX MD: CPT

## 2020-08-07 PROCEDURE — 92567 TYMPANOMETRY: CPT

## 2020-08-07 PROCEDURE — 92557 COMPREHENSIVE HEARING TEST: CPT

## 2020-08-07 PROCEDURE — 99214 OFFICE O/P EST MOD 30 MIN: CPT | Mod: 25

## 2020-08-07 RX ORDER — FLUTICASONE PROPIONATE 50 UG/1
50 SPRAY, METERED NASAL DAILY
Qty: 1 | Refills: 10 | Status: ACTIVE | COMMUNITY
Start: 2020-08-07 | End: 1900-01-01

## 2020-08-07 NOTE — HISTORY OF PRESENT ILLNESS
[Hearing Loss] : hearing loss [No] : patient does not have a  history of radiation therapy [de-identified] : 45 yo female\par Patient with hx of moderate cerumen impaction complains of hearing loss left ear x 2 weeks. States hearing is muffled. Thinks its due to wax buildup. Otherwise feeling well.\par Pt has no ear pain, ear drainage, tinnitus, vertigo, nasal congestion, nasal discharge, epistaxis, sinus infections, facial pain, facial pressure, throat pain, dysphagia or fevers\par Chr moderate AM PND\par \par  [Headache] : no headache [Dizziness] : no dizziness [Anxiety] : no anxiety [Recurrent Otitis Media] : no recurrent otitis media [Otitis Media with Effusion] : no otitis media with effusion [Congenital Ear Malformation] : no congenital ear malformation [Presbycusis] : no presbycusis [Meniere Disease] : no Meniere disease [Otosclerosis] : no otosclerosis [Perilymphatic Fistula] : no perilymphatic fistula [Hypertension] : no hypertension [Loud Noise Exposure] : no history of loud noise exposure [Smoking] : no smoking [Stroke] : no stroke [Early Onset Hearing Loss] : no early onset hearing loss [Facial Pain] : no facial pain [Facial Pressure] : no facial pressure [Nasal Congestion] : no nasal congestion [Diplopia] : no diplopia [Allergic Rhinitis] : no allergic rhinitis [Ear Fullness] : no ear fullness [Maxillary Tooth Infection] : no maxillary tooth infection [Environmental Allergies] : no environmental allergies [Septal Deviation] : no septal deviation [Seasonal Allergies] : no seasonal allergies [Environmental Allergens] : no environmental allergens [Adenoidectomy] : no adenoidectomy [Nasal FB Removal] : no nasal foreign body removal [Asthma] : no asthma [Allergies] : no allergies [Chills] : no chills [Neck Mass] : no neck mass [Neck Pain] : no neck pain [Cold Intolerance] : no cold intolerance [Cough] : no cough [Fatigue] : no fatigue [Heat Intolerance] : no heat intolerance [Hyperthyroidism] : no hyperthyroidism [Sialadenitis] : no sialadenitis [Hodgkin Disease] : no hodgkin disease [Alcohol Use] : no alcohol use [Non-Hodgkin Lymphoma] : no non-hodgkin lymphoma [Tobacco Use] : no tobacco use [Graves Disease] : no graves disease [Thyroid Cancer] : no thyroid cancer

## 2020-08-07 NOTE — DATA REVIEWED
[de-identified] : SNHL at 3K bilat-minimal\par Hearing Test performed to evaluate the extent of hearing loss and  to explain pt's symptoms\par

## 2020-08-07 NOTE — PHYSICAL EXAM
[Midline] : trachea located in midline position [Normal] : no rashes [Nasal Endoscopy Performed] : nasal endoscopy was performed, see procedure section for findings [] : septum deviated to the left [de-identified] : b/l arelyn  [de-identified] : b/l congestion

## 2020-10-02 NOTE — ASSESSMENT
[FreeTextEntry1] : Wax:\par -Cerumen is removed from the right and left  ear canal with a curette and suction.\par -Rx:Debrox be placed in both ears on a routine basis to keep them free of wax.\par -Routine debridement suggested to keep the ears free of wax.\par \par DNS and ETD \par Rx: Steam humidification and hypertonic saline nasal irrigations \par rx-Flnoase\par \par -Hearing Test performed to evaluate the extent of hearing loss and to explain pt's symptoms-3K SNHL-minimal\par \par  Controlled with current regime

## 2021-01-14 ENCOUNTER — APPOINTMENT (OUTPATIENT)
Dept: PAIN MANAGEMENT | Facility: CLINIC | Age: 46
End: 2021-01-14
Payer: COMMERCIAL

## 2021-01-14 VITALS
DIASTOLIC BLOOD PRESSURE: 80 MMHG | BODY MASS INDEX: 28.51 KG/M2 | WEIGHT: 167 LBS | HEIGHT: 64 IN | SYSTOLIC BLOOD PRESSURE: 129 MMHG | HEART RATE: 76 BPM

## 2021-01-14 DIAGNOSIS — G44.40 DRUG-INDUCED HEADACHE, NOT ELSEWHERE CLASSIFIED, NOT INTRACTABLE: ICD-10-CM

## 2021-01-14 DIAGNOSIS — G43.709 CHRONIC MIGRAINE W/OUT AURA, NOT INTRACTABLE, W/OUT STATUS MIGRAINOSUS: ICD-10-CM

## 2021-01-14 DIAGNOSIS — R51.9 HEADACHE, UNSPECIFIED: ICD-10-CM

## 2021-01-14 PROCEDURE — 99214 OFFICE O/P EST MOD 30 MIN: CPT

## 2021-01-14 PROCEDURE — 99072 ADDL SUPL MATRL&STAF TM PHE: CPT

## 2021-01-14 RX ORDER — METOCLOPRAMIDE 10 MG/1
10 TABLET ORAL EVERY 8 HOURS
Qty: 50 | Refills: 1 | Status: ACTIVE | COMMUNITY
Start: 2020-05-06 | End: 1900-01-01

## 2021-01-14 RX ORDER — NARATRIPTAN 2.5 MG/1
2.5 TABLET, FILM COATED ORAL
Qty: 18 | Refills: 3 | Status: DISCONTINUED | COMMUNITY
Start: 2017-08-09 | End: 2021-01-14

## 2021-01-14 NOTE — REVIEW OF SYSTEMS
[Fever] : no fever [Chills] : no chills [Feeling Poorly] : feeling poorly [Feeling Tired] : feeling tired [Eye Pain] : eye pain [Eyesight Problems] : no eyesight problems [Loss Of Hearing] : no hearing loss [Nasal Discharge] : no nasal discharge [Chest Pain] : no chest pain [Palpitations] : no palpitations [Cough] : no cough [Constipation] : no constipation [Diarrhea] : no diarrhea [Arthralgias] : arthralgias [Neck Pain] : neck pain [Lower Back Pain] : no lower back pain [Skin Lesions] : no skin lesions [Skin Wound] : no skin wound [Itching] : no itching [Dizziness] : no dizziness [Fainting] : no fainting [Sleep Disturbances] : sleep disturbances [Muscle Weakness] : no muscle weakness [Swollen Glands] : no swollen glands [Swollen Glands In The Neck] : no swollen glands in the neck

## 2021-01-14 NOTE — HISTORY OF PRESENT ILLNESS
[FreeTextEntry1] : Pt notes that she is getting a headache which occurs for 2 weeks prior to cycle and then breaks.  Is still using flexeril and diclofenac and working longer to "kick in" then before.  Last time found they were taking about 2 hours as well.  \par Getting tenderness to top of scalp and using lidocaine creme.\par Has overall been feeling worse with this. [Chronic Headache] : chronic headache [Nausea] : nausea [Photophobia] : photophobia [Phonophobia] : phonophobia [Neck Pain] : neck pain [Scalp Tenderness] : scalp tenderness [> 4 hours] : > 4 hours [worsened] : worsened [Stable] : The patient reports ~his/her~ symptoms since the last visit are stable [de-identified] : 2 weeks

## 2021-01-14 NOTE — PHYSICAL EXAM
[General Appearance - Alert] : alert [General Appearance - In No Acute Distress] : in no acute distress [General Appearance - Well Nourished] : well nourished [General Appearance - Well Developed] : well developed [General Appearance - Well-Appearing] : healthy appearing [Oriented To Time, Place, And Person] : oriented to person, place, and time [Impaired Insight] : insight and judgment were intact [Affect] : the affect was normal [Mood] : the mood was normal [Memory Recent] : recent memory was not impaired [Memory Remote] : remote memory was not impaired [Person] : oriented to person [Place] : oriented to place [Time] : oriented to time [Short Term Intact] : short term memory intact [Remote Intact] : remote memory intact [Registration Intact] : recent registration memory intact [Concentration Intact] : normal concentrating ability [Naming Objects] : no difficulty naming common objects [Fluency] : fluency intact [Comprehension] : comprehension intact [Current Events] : adequate knowledge of current events [Past History] : adequate knowledge of personal past history [Vocabulary] : adequate range of vocabulary [Cranial Nerves Oculomotor (III)] : extraocular motion intact [Cranial Nerves Facial (VII)] : face symmetrical [Cranial Nerves Vestibulocochlear (VIII)] : hearing was intact bilaterally [Cranial Nerves Accessory (XI - Cranial And Spinal)] : head turning and shoulder shrug symmetric [Cranial Nerves Hypoglossal (XII)] : there was no tongue deviation with protrusion [No Muscle Atrophy] : normal bulk in all four extremities [Motor Handedness Right-Handed] : the patient is right hand dominant [Tremor] : no tremor present [Dysdiadochokinesia Bilaterally] : not present [Sclera] : the sclera and conjunctiva were normal [No VIKC] : no internuclear ophthalmoplegia [Outer Ear] : the ears and nose were normal in appearance [Neck Cervical Mass (___cm)] : no neck mass was observed [Exaggerated Use Of Accessory Muscles For Inspiration] : no accessory muscle use [Edema] : there was no peripheral edema [No Spinal Tenderness] : no spinal tenderness [Involuntary Movements] : no involuntary movements were seen [Skin Color & Pigmentation] : normal skin color and pigmentation [] : no rash

## 2021-01-22 RX ORDER — ALMOTRIPTAN 12.5 MG/1
12.5 TABLET, FILM COATED ORAL
Qty: 18 | Refills: 5 | Status: ACTIVE | COMMUNITY
Start: 2021-01-22 | End: 1900-01-01

## 2021-04-14 ENCOUNTER — APPOINTMENT (OUTPATIENT)
Dept: PAIN MANAGEMENT | Facility: CLINIC | Age: 46
End: 2021-04-14
Payer: COMMERCIAL

## 2021-04-14 VITALS
DIASTOLIC BLOOD PRESSURE: 74 MMHG | HEART RATE: 83 BPM | BODY MASS INDEX: 28 KG/M2 | SYSTOLIC BLOOD PRESSURE: 116 MMHG | WEIGHT: 164 LBS | HEIGHT: 64 IN

## 2021-04-14 VITALS — TEMPERATURE: 97.3 F

## 2021-04-14 PROCEDURE — 99212 OFFICE O/P EST SF 10 MIN: CPT

## 2021-04-14 PROCEDURE — 99072 ADDL SUPL MATRL&STAF TM PHE: CPT

## 2021-04-14 NOTE — HISTORY OF PRESENT ILLNESS
[Headache] : headache [Dizziness] : dizziness [Nausea] : nausea [___ Times Per Month] : [unfilled] times per month [FreeTextEntry1] : Pt returns today for a followup visit ,continues to have migarine , will have severe migraine 2-3x/ month and then less severe migraine throughout the month . \par Did have dizziness before onset of migraine in in February , which was a change. \par Axert is somewhat helpful but less so for severe events. \par Discussed adding prevention . Pt has trued Topamax but would like to re-try. Denies any adverse effects on Topamax.

## 2021-04-14 NOTE — PHYSICAL EXAM
[General Appearance - Alert] : alert [General Appearance - Well-Appearing] : healthy appearing [Oriented To Time, Place, And Person] : oriented to person, place, and time [Affect] : the affect was normal [Mood] : the mood was normal [Cranial Nerves Facial (VII)] : face symmetrical [Cranial Nerves Accessory (XI - Cranial And Spinal)] : head turning and shoulder shrug symmetric [Cranial Nerves Hypoglossal (XII)] : there was no tongue deviation with protrusion [Motor Strength] : muscle strength was normal in all four extremities [Motor Strength Lower Extremities Bilaterally] : strength was normal in both lower extremities [Abnormal Walk] : normal gait [Sclera] : the sclera and conjunctiva were normal [PERRL With Normal Accommodation] : pupils were equal in size, round, reactive to light, with normal accommodation [Extraocular Movements] : extraocular movements were intact [] : no respiratory distress

## 2021-04-14 NOTE — ASSESSMENT
[FreeTextEntry1] : ret- try Topamax , reviewed with pt potential adverse effects. \par Pt advised to stay hydrated . \par RTO 1 month.

## 2021-05-12 ENCOUNTER — APPOINTMENT (OUTPATIENT)
Dept: PAIN MANAGEMENT | Facility: CLINIC | Age: 46
End: 2021-05-12
Payer: COMMERCIAL

## 2021-05-12 VITALS
HEIGHT: 64 IN | WEIGHT: 164 LBS | DIASTOLIC BLOOD PRESSURE: 72 MMHG | SYSTOLIC BLOOD PRESSURE: 118 MMHG | BODY MASS INDEX: 28 KG/M2 | HEART RATE: 77 BPM

## 2021-05-12 PROCEDURE — 99212 OFFICE O/P EST SF 10 MIN: CPT

## 2021-05-12 PROCEDURE — 99072 ADDL SUPL MATRL&STAF TM PHE: CPT

## 2021-05-12 NOTE — HISTORY OF PRESENT ILLNESS
[FreeTextEntry1] : Topamax seemed to increase migraine frequency and severity. \par Pt is feeling worse  on it and finds gums painful since starting it.  [Headache] : headache [Nausea] : nausea [Photophobia] : photophobia [Phonophobia] : phonophobia [___ Times Per Week] : [unfilled] times each week

## 2021-05-14 ENCOUNTER — NON-APPOINTMENT (OUTPATIENT)
Age: 46
End: 2021-05-14

## 2021-06-14 ENCOUNTER — APPOINTMENT (OUTPATIENT)
Dept: PAIN MANAGEMENT | Facility: CLINIC | Age: 46
End: 2021-06-14
Payer: COMMERCIAL

## 2021-06-14 VITALS
BODY MASS INDEX: 27.66 KG/M2 | WEIGHT: 162 LBS | SYSTOLIC BLOOD PRESSURE: 118 MMHG | DIASTOLIC BLOOD PRESSURE: 80 MMHG | HEART RATE: 87 BPM | HEIGHT: 64 IN

## 2021-06-14 PROCEDURE — 99212 OFFICE O/P EST SF 10 MIN: CPT

## 2021-06-14 RX ORDER — TOPIRAMATE 25 MG/1
25 TABLET, FILM COATED ORAL
Qty: 120 | Refills: 1 | Status: DISCONTINUED | COMMUNITY
Start: 2021-04-14 | End: 2021-06-14

## 2021-06-22 ENCOUNTER — APPOINTMENT (OUTPATIENT)
Dept: ORTHOPEDIC SURGERY | Facility: CLINIC | Age: 46
End: 2021-06-22
Payer: COMMERCIAL

## 2021-06-22 VITALS
SYSTOLIC BLOOD PRESSURE: 130 MMHG | HEIGHT: 64 IN | DIASTOLIC BLOOD PRESSURE: 72 MMHG | WEIGHT: 160 LBS | BODY MASS INDEX: 27.31 KG/M2 | HEART RATE: 97 BPM

## 2021-06-22 PROCEDURE — 99214 OFFICE O/P EST MOD 30 MIN: CPT | Mod: 25

## 2021-06-22 PROCEDURE — 20600 DRAIN/INJ JOINT/BURSA W/O US: CPT | Mod: F6

## 2021-07-14 RX ORDER — KETOROLAC TROMETHAMINE 10 MG/1
10 TABLET, FILM COATED ORAL
Qty: 12 | Refills: 0 | Status: ACTIVE | COMMUNITY
Start: 2021-07-14 | End: 1900-01-01

## 2021-08-03 ENCOUNTER — APPOINTMENT (OUTPATIENT)
Dept: MAMMOGRAPHY | Facility: IMAGING CENTER | Age: 46
End: 2021-08-03
Payer: COMMERCIAL

## 2021-08-03 ENCOUNTER — OUTPATIENT (OUTPATIENT)
Dept: OUTPATIENT SERVICES | Facility: HOSPITAL | Age: 46
LOS: 1 days | End: 2021-08-03
Payer: COMMERCIAL

## 2021-08-03 ENCOUNTER — APPOINTMENT (OUTPATIENT)
Dept: ULTRASOUND IMAGING | Facility: IMAGING CENTER | Age: 46
End: 2021-08-03
Payer: COMMERCIAL

## 2021-08-03 DIAGNOSIS — R92.2 INCONCLUSIVE MAMMOGRAM: ICD-10-CM

## 2021-08-03 DIAGNOSIS — Z98.890 OTHER SPECIFIED POSTPROCEDURAL STATES: Chronic | ICD-10-CM

## 2021-08-03 DIAGNOSIS — Z00.8 ENCOUNTER FOR OTHER GENERAL EXAMINATION: ICD-10-CM

## 2021-08-03 DIAGNOSIS — Z12.31 ENCOUNTER FOR SCREENING MAMMOGRAM FOR MALIGNANT NEOPLASM OF BREAST: ICD-10-CM

## 2021-08-03 PROCEDURE — 76641 ULTRASOUND BREAST COMPLETE: CPT

## 2021-08-03 PROCEDURE — 76641 ULTRASOUND BREAST COMPLETE: CPT | Mod: 26,50

## 2021-08-03 PROCEDURE — 77067 SCR MAMMO BI INCL CAD: CPT

## 2021-08-03 PROCEDURE — 77063 BREAST TOMOSYNTHESIS BI: CPT | Mod: 26

## 2021-08-03 PROCEDURE — 77067 SCR MAMMO BI INCL CAD: CPT | Mod: 26

## 2021-08-03 PROCEDURE — 77063 BREAST TOMOSYNTHESIS BI: CPT

## 2021-08-26 ENCOUNTER — RESULT REVIEW (OUTPATIENT)
Age: 46
End: 2021-08-26

## 2021-09-03 NOTE — PHYSICAL EXAM
[General Appearance - Alert] : alert [General Appearance - Well-Appearing] : healthy appearing [Oriented To Time, Place, And Person] : oriented to person, place, and time [Affect] : the affect was normal [Mood] : the mood was normal [Sclera] : the sclera and conjunctiva were normal [PERRL With Normal Accommodation] : pupils were equal in size, round, reactive to light, with normal accommodation [Extraocular Movements] : extraocular movements were intact [] : no respiratory distress [Abnormal Walk] : normal gait [Paresis Pronator Drift Right-Sided] : no pronator drift on the right [Paresis Pronator Drift Left-Sided] : no pronator drift on the left [Motor Strength Upper Extremities Bilaterally] : strength was normal in both upper extremities [Motor Strength Lower Extremities Bilaterally] : strength was normal in both lower extremities

## 2021-09-03 NOTE — HISTORY OF PRESENT ILLNESS
[Headache] : headache [Nausea] : nausea [Photophobia] : photophobia [___ Times Per Month] : [unfilled] times per month [FreeTextEntry1] : Pt has taken Nurtec 2x. Had a very good response. Migraine was aborted and pt did not experience any adverse effects. \par Denies any new health issues. \par \par Pt has tried and failed : \par Almotriptan, Naratriptan\par Effexor, Nortriptyline, Cymbalta, Topamax.

## 2021-09-14 ENCOUNTER — APPOINTMENT (OUTPATIENT)
Dept: PAIN MANAGEMENT | Facility: CLINIC | Age: 46
End: 2021-09-14
Payer: COMMERCIAL

## 2021-09-14 VITALS
DIASTOLIC BLOOD PRESSURE: 71 MMHG | WEIGHT: 157 LBS | HEART RATE: 76 BPM | SYSTOLIC BLOOD PRESSURE: 106 MMHG | BODY MASS INDEX: 26.8 KG/M2 | HEIGHT: 64 IN

## 2021-09-14 PROCEDURE — 99212 OFFICE O/P EST SF 10 MIN: CPT

## 2021-09-14 RX ORDER — CYCLOBENZAPRINE HYDROCHLORIDE 10 MG/1
10 TABLET, FILM COATED ORAL 3 TIMES DAILY
Qty: 60 | Refills: 1 | Status: ACTIVE | COMMUNITY
Start: 1900-01-01 | End: 1900-01-01

## 2021-09-14 RX ORDER — MELOXICAM 15 MG/1
15 TABLET ORAL
Qty: 30 | Refills: 1 | Status: DISCONTINUED | COMMUNITY
Start: 2021-06-22 | End: 2021-09-14

## 2021-09-14 NOTE — PHYSICAL EXAM
[General Appearance - Alert] : alert [General Appearance - Well-Appearing] : healthy appearing [Oriented To Time, Place, And Person] : oriented to person, place, and time [Affect] : the affect was normal [Mood] : the mood was normal [Paresis Pronator Drift Right-Sided] : no pronator drift on the right [Paresis Pronator Drift Left-Sided] : no pronator drift on the left [Motor Strength Upper Extremities Bilaterally] : strength was normal in both upper extremities [Motor Strength Lower Extremities Bilaterally] : strength was normal in both lower extremities [Sclera] : the sclera and conjunctiva were normal [PERRL With Normal Accommodation] : pupils were equal in size, round, reactive to light, with normal accommodation [Extraocular Movements] : extraocular movements were intact [] : no respiratory distress [Abnormal Walk] : normal gait

## 2021-09-14 NOTE — HISTORY OF PRESENT ILLNESS
[FreeTextEntry1] : Pt has taken Nurtec . Had a very good response. Migraine was aborted and pt did not experience any adverse effects. \par Denies any new health issues. \par \par Pt has tried and failed : \par Almotriptan, Naratriptan\par Effexor, Nortriptyline, Cymbalta, Topamax.\par \par Pt is very happy with response to Nurtec .  [Headache] : headache [Nausea] : nausea [Photophobia] : photophobia [___ Times Per Month] : [unfilled] times per month

## 2021-12-22 ENCOUNTER — TRANSCRIPTION ENCOUNTER (OUTPATIENT)
Age: 46
End: 2021-12-22

## 2021-12-22 ENCOUNTER — APPOINTMENT (OUTPATIENT)
Dept: PAIN MANAGEMENT | Facility: CLINIC | Age: 46
End: 2021-12-22
Payer: COMMERCIAL

## 2021-12-22 VITALS
SYSTOLIC BLOOD PRESSURE: 118 MMHG | HEIGHT: 64 IN | BODY MASS INDEX: 26.63 KG/M2 | HEART RATE: 86 BPM | DIASTOLIC BLOOD PRESSURE: 78 MMHG | WEIGHT: 156 LBS

## 2021-12-22 PROCEDURE — 99213 OFFICE O/P EST LOW 20 MIN: CPT

## 2021-12-22 NOTE — ASSESSMENT
[FreeTextEntry1] : No changes today . \par Consult PCP if urine continues to smell odd\par  RTO 3 months

## 2021-12-22 NOTE — HISTORY OF PRESENT ILLNESS
[Nausea] : nausea [Photophobia] : photophobia [Phonophobia] : phonophobia [___ Times Per Month] : [unfilled] times per month [FreeTextEntry1] : Taking Nurtec to abort migraine prn .Has noted urine smells metallic after taking it. \par Pt denies any pain with urination , no blood in urine. \par Nurtec generally aborts migraine well . \par Migraine occurs 1x/ month \par \par No new health issues .

## 2022-01-04 ENCOUNTER — APPOINTMENT (OUTPATIENT)
Dept: ORTHOPEDIC SURGERY | Facility: CLINIC | Age: 47
End: 2022-01-04
Payer: COMMERCIAL

## 2022-01-04 DIAGNOSIS — S63.659D SPRAIN OF METACARPOPHALANGEAL JOINT OF UNSPECIFIED FINGER, SUBSEQUENT ENCOUNTER: ICD-10-CM

## 2022-01-04 DIAGNOSIS — G56.21 LESION OF ULNAR NERVE, RIGHT UPPER LIMB: ICD-10-CM

## 2022-01-04 PROCEDURE — 99214 OFFICE O/P EST MOD 30 MIN: CPT | Mod: 25

## 2022-01-06 ENCOUNTER — APPOINTMENT (OUTPATIENT)
Dept: OTOLARYNGOLOGY | Facility: CLINIC | Age: 47
End: 2022-01-06

## 2022-01-17 ENCOUNTER — RX RENEWAL (OUTPATIENT)
Age: 47
End: 2022-01-17

## 2022-02-10 ENCOUNTER — APPOINTMENT (OUTPATIENT)
Dept: OTOLARYNGOLOGY | Facility: CLINIC | Age: 47
End: 2022-02-10
Payer: COMMERCIAL

## 2022-02-10 VITALS
HEART RATE: 96 BPM | SYSTOLIC BLOOD PRESSURE: 118 MMHG | WEIGHT: 153 LBS | HEIGHT: 64 IN | BODY MASS INDEX: 26.12 KG/M2 | DIASTOLIC BLOOD PRESSURE: 76 MMHG | TEMPERATURE: 98 F

## 2022-02-10 DIAGNOSIS — H61.23 IMPACTED CERUMEN, BILATERAL: ICD-10-CM

## 2022-02-10 DIAGNOSIS — J34.2 DEVIATED NASAL SEPTUM: ICD-10-CM

## 2022-02-10 DIAGNOSIS — J31.0 CHRONIC RHINITIS: ICD-10-CM

## 2022-02-10 DIAGNOSIS — H90.3 SENSORINEURAL HEARING LOSS, BILATERAL: ICD-10-CM

## 2022-02-10 PROCEDURE — 31231 NASAL ENDOSCOPY DX: CPT

## 2022-02-10 PROCEDURE — 69210 REMOVE IMPACTED EAR WAX UNI: CPT

## 2022-02-10 PROCEDURE — 99213 OFFICE O/P EST LOW 20 MIN: CPT | Mod: 25

## 2022-02-10 NOTE — DATA REVIEWED
[de-identified] : SNHL at 3K bilat-minimal\par Hearing Test performed to evaluate the extent of hearing loss and  to explain pt's symptoms\par

## 2022-02-10 NOTE — HISTORY OF PRESENT ILLNESS
[No] : patient does not have a  history of radiation therapy [Hearing Loss] : hearing loss [de-identified] : 47 yo female\par Patient with hx of moderate cerumen impaction presents for routine ear cleaning. States hearing is muffled. Thinks its due to wax buildup. She continues to have nasal congestion despite using Flonase daily.  Pt has no ear pain, ear drainage, tinnitus, vertigo, nasal discharge, epistaxis, sinus infections, facial pain, facial pressure, throat pain, dysphagia or fevers\par \par  [Anxiety] : no anxiety [Dizziness] : no dizziness [Headache] : no headache [Recurrent Otitis Media] : no recurrent otitis media [Otitis Media with Effusion] : no otitis media with effusion [Presbycusis] : no presbycusis [Congenital Ear Malformation] : no congenital ear malformation [Meniere Disease] : no Meniere disease [Otosclerosis] : no otosclerosis [Perilymphatic Fistula] : no perilymphatic fistula [Hypertension] : no hypertension [Loud Noise Exposure] : no history of loud noise exposure [Smoking] : no smoking [Early Onset Hearing Loss] : no early onset hearing loss [Stroke] : no stroke [Facial Pain] : no facial pain [Facial Pressure] : no facial pressure [Nasal Congestion] : no nasal congestion [Diplopia] : no diplopia [Ear Fullness] : no ear fullness [Allergic Rhinitis] : no allergic rhinitis [Maxillary Tooth Infection] : no maxillary tooth infection [Septal Deviation] : no septal deviation [Environmental Allergies] : no environmental allergies [Seasonal Allergies] : no seasonal allergies [Environmental Allergens] : no environmental allergens [Adenoidectomy] : no adenoidectomy [Nasal FB Removal] : no nasal foreign body removal [Allergies] : no allergies [Asthma] : no asthma [Neck Mass] : no neck mass [Neck Pain] : no neck pain [Chills] : no chills [Cold Intolerance] : no cold intolerance [Cough] : no cough [Fatigue] : no fatigue [Heat Intolerance] : no heat intolerance [Hyperthyroidism] : no hyperthyroidism [Sialadenitis] : no sialadenitis [Hodgkin Disease] : no hodgkin disease [Non-Hodgkin Lymphoma] : no non-hodgkin lymphoma [Tobacco Use] : no tobacco use [Alcohol Use] : no alcohol use [Graves Disease] : no graves disease [Thyroid Cancer] : no thyroid cancer

## 2022-02-10 NOTE — PHYSICAL EXAM
[] : septum deviated to the left [Midline] : trachea located in midline position [Normal] : no rashes [Nasal Endoscopy Performed] : nasal endoscopy was performed, see procedure section for findings [de-identified] : b/l arelyn

## 2022-02-10 NOTE — ASSESSMENT
[FreeTextEntry1] : Ms. LIN 46 year F with cerumen impcation presents for routine ear cleaning, she complains of nasal congestion uses Flonase daily with no relief \par \par Wax:\par -Cerumen is removed from the right and left  ear canal with a curette and suction.\par -Rx:Debrox be placed in both ears on a routine basis to keep them free of wax.\par -Routine debridement suggested to keep the ears free of wax.\par \par DNS and Rhinitis\par -Rx: Steam humidification \par -Hypertonic saline nasal irrigations \par \par f/u prn

## 2022-03-10 ENCOUNTER — TRANSCRIPTION ENCOUNTER (OUTPATIENT)
Age: 47
End: 2022-03-10

## 2022-03-17 ENCOUNTER — APPOINTMENT (OUTPATIENT)
Dept: PAIN MANAGEMENT | Facility: CLINIC | Age: 47
End: 2022-03-17
Payer: COMMERCIAL

## 2022-03-17 VITALS
BODY MASS INDEX: 26.46 KG/M2 | SYSTOLIC BLOOD PRESSURE: 110 MMHG | WEIGHT: 155 LBS | DIASTOLIC BLOOD PRESSURE: 74 MMHG | HEART RATE: 76 BPM | HEIGHT: 64 IN

## 2022-03-17 DIAGNOSIS — M54.2 CERVICALGIA: ICD-10-CM

## 2022-03-17 DIAGNOSIS — G47.30 SLEEP APNEA, UNSPECIFIED: ICD-10-CM

## 2022-03-17 DIAGNOSIS — G47.00 INSOMNIA, UNSPECIFIED: ICD-10-CM

## 2022-03-17 PROCEDURE — 99214 OFFICE O/P EST MOD 30 MIN: CPT

## 2022-03-17 NOTE — REVIEW OF SYSTEMS
[Fever] : no fever [Chills] : no chills [Feeling Tired] : feeling tired [Eye Pain] : eye pain [Eyesight Problems] : no eyesight problems [Chest Pain] : no chest pain [Cough] : no cough [Arthralgias] : arthralgias [Neck Pain] : neck pain [Lower Back Pain] : no lower back pain [Itching] : no itching [Sleep Disturbances] : sleep disturbances [Muscle Weakness] : no muscle weakness [Swollen Glands] : no swollen glands

## 2022-03-17 NOTE — PHYSICAL EXAM
[General Appearance - Alert] : alert [General Appearance - In No Acute Distress] : in no acute distress [General Appearance - Well Nourished] : well nourished [General Appearance - Well Developed] : well developed [General Appearance - Well-Appearing] : healthy appearing [Oriented To Time, Place, And Person] : oriented to person, place, and time [Impaired Insight] : insight and judgment were intact [Affect] : the affect was normal [Mood] : the mood was normal [Memory Recent] : recent memory was not impaired [Memory Remote] : remote memory was not impaired [Person] : oriented to person [Place] : oriented to place [Time] : oriented to time [Short Term Intact] : short term memory intact [Remote Intact] : remote memory intact [Concentration Intact] : normal concentrating ability [Registration Intact] : recent registration memory intact [Naming Objects] : no difficulty naming common objects [Fluency] : fluency intact [Comprehension] : comprehension intact [Current Events] : adequate knowledge of current events [Past History] : adequate knowledge of personal past history [Vocabulary] : adequate range of vocabulary [Cranial Nerves Facial (VII)] : face symmetrical [Cranial Nerves Oculomotor (III)] : extraocular motion intact [Cranial Nerves Vestibulocochlear (VIII)] : hearing was intact bilaterally [Cranial Nerves Accessory (XI - Cranial And Spinal)] : head turning and shoulder shrug symmetric [Cranial Nerves Hypoglossal (XII)] : there was no tongue deviation with protrusion [No Muscle Atrophy] : normal bulk in all four extremities [Motor Handedness Right-Handed] : the patient is right hand dominant [Abnormal Walk] : normal gait [Tremor] : no tremor present [Dysdiadochokinesia Bilaterally] : not present [Sclera] : the sclera and conjunctiva were normal [No VICK] : no internuclear ophthalmoplegia [Outer Ear] : the ears and nose were normal in appearance [Neck Cervical Mass (___cm)] : no neck mass was observed [Exaggerated Use Of Accessory Muscles For Inspiration] : no accessory muscle use [Edema] : there was no peripheral edema [No Spinal Tenderness] : no spinal tenderness [Involuntary Movements] : no involuntary movements were seen [Skin Color & Pigmentation] : normal skin color and pigmentation [] : no rash

## 2022-03-17 NOTE — HISTORY OF PRESENT ILLNESS
[FreeTextEntry1] : Pt notes that she has had good control over her headaches with occasional spike in headache which she treats with intermittent Nurtec.  \par saw ENT and has deviated septum and inflammation.\par Uses nasal spray

## 2022-04-07 ENCOUNTER — APPOINTMENT (OUTPATIENT)
Dept: PULMONOLOGY | Facility: CLINIC | Age: 47
End: 2022-04-07
Payer: COMMERCIAL

## 2022-04-07 VITALS
HEIGHT: 64 IN | BODY MASS INDEX: 26.46 KG/M2 | HEART RATE: 82 BPM | RESPIRATION RATE: 15 BRPM | TEMPERATURE: 98 F | OXYGEN SATURATION: 97 % | SYSTOLIC BLOOD PRESSURE: 110 MMHG | WEIGHT: 155 LBS | DIASTOLIC BLOOD PRESSURE: 74 MMHG

## 2022-04-07 DIAGNOSIS — R06.83 SNORING: ICD-10-CM

## 2022-04-07 PROCEDURE — 99205 OFFICE O/P NEW HI 60 MIN: CPT

## 2022-04-07 NOTE — ASSESSMENT
[FreeTextEntry1] : 47 y/o F with a PMH of migraine headaches presented to the sleep clinic for an initial evaluation.\par \par Her main complaint is frequent night time awakenings and EDS.  She notes undergoing a sleep study many years ago and was told he may have DEJAN and narcolepsy.  She notes snoring and apneic events with multiple night time awakenings.  Takes two naps salvador which are restorative.  Denies cataplexy, sleep paralysis or any hallucinations. Her ESS was 15 on today's visit.  Unclear if her hypersomnolence is related to underlying DEJAN vs T2N/IH.  The patient was referred to the [facility] for diagnostic polysomnography/MSLT for further assessment. The ramifications of obstructive sleep apnea/T2N/IH and its potential therapeutic modalities were discussed with the patient. The dangers of drowsy driving were discussed with the patient.  The patient was warned to avoid drowsy driving. The patient will followup after results of this study are available.\par  \par Al Adamson, DO\par Pulmonary, Critical Care and Sleep Medicine Attending

## 2022-04-07 NOTE — HISTORY OF PRESENT ILLNESS
[Snoring] : snoring [Witnessed Apneas] : witnessed sleep apnea [Frequent Nocturnal Awakening] : frequent nocturnal awakening [Unintentional Sleep While Inactive] : unintentional sleep while inactive [Awakes Unrefreshed] : awakening unrefreshed [Awakening With Dry Mouth] : awakening with dry mouth [Daytime Somnolence] : daytime somnolence [DMS] : DMS [Hypersomnolence] : hypersomnolence [FreeTextEntry1] : 47 y/o F with a PMH of migraine headaches presented to the sleep clinic for an initial evaluation.\par \par Her main complaint is frequent night time awakenings and EDS.  She notes undergoing a sleep study many years ago and was told he may have DEJAN and narcolepsy.  She goes to bed at 10 pm and has no issues falling alseep.  She has 2-3 awakenings during the course of the night.  Snores and has apneic events.  Wakes up at 6:16 am feeling unrefreshed.  Takes two naps during the day which are restorative in nature.  Denies cataplexy, sleep paralysis or any sleep associated hallucinations.  Her ESS was 15 on today's visit.  She lost 13 lbs intentionally.  Has not gone through menopause.  Denies head trauma or prolonged illness. \par \par ____________________________________________________________________\par EPWORTH SLEEPINESS SCALE\par \par How likely are you to doze off or fall asleep in the situations described below, in contrast to feeling just tired?  \par This refers to your usual way of life in recent times.  \par Even if you haven't done some of these things recently, try to work out how they would have affected you.\par Use the following scale to choose one most appropriate number for each situation.\par \par Chance of dozing.............Situation\par ...............3........................Sitting and reading\par ...............2.........................Watching TV\par ...............2.........................Sitting inactive in a public place (eg a theatre or a meeting)\par ...............1.........................As a passenger in a car for an hour without a break\par ...............3.........................Lying down to rest in the afternoon when circumstances permit\par ...............0.........................Sitting and talking to someone\par ...............3.........................Sitting quietly after lunch without alcohol\par ...............1.........................In a car, while stopped for a few minutes in traffic\par \par ...............15.........................TOTAL SCORE\par \par 0 = Never would doze\par 1 = Slight chance of dozing\par 2 = Moderate chance of dozing\par 3 = High chance of dozing \par ______________________________________________________________________  [Recent  Weight Gain] : no recent weight gain [DIS] : no DIS [Unusual Sleep Behavior] : no unusual sleep behavior [Cataplexy] : no cataplexy [Sleep Paralysis] : no sleep paralysis [Hypnagogic Hallucinations] : no hallucinations when falling asleep [Hypnopompic Hallucinations] : no hallucinations when awakening [Lower Extremity Discomfort] : no lower extremity discomfort in evening or at bedtime [ESS] : 15

## 2022-04-07 NOTE — PHYSICAL EXAM
[General Appearance - Well Developed] : well developed [Normal Appearance] : normal appearance [Well Groomed] : well groomed [General Appearance - Well Nourished] : well nourished [No Deformities] : no deformities [General Appearance - In No Acute Distress] : no acute distress [Normal Conjunctiva] : the conjunctiva exhibited no abnormalities [Eyelids - No Xanthelasma] : the eyelids demonstrated no xanthelasmas [Low Lying Soft Palate] : low lying soft palate [II] : II [Neck Appearance] : the appearance of the neck was normal [Neck Cervical Mass (___cm)] : no neck mass was observed [Jugular Venous Distention Increased] : there was no jugular-venous distention [Thyroid Diffuse Enlargement] : the thyroid was not enlarged [Thyroid Nodule] : there were no palpable thyroid nodules [Heart Rate And Rhythm] : heart rate was normal and rhythm regular [Heart Sounds] : normal S1 and S2 [Heart Sounds Gallop] : no gallops [Murmurs] : no murmurs [Heart Sounds Pericardial Friction Rub] : no pericardial rub [Auscultation Breath Sounds / Voice Sounds] : lungs were clear to auscultation bilaterally [Abnormal Walk] : normal gait [Musculoskeletal - Swelling] : no joint swelling seen [Motor Tone] : muscle strength and tone were normal [Nail Clubbing] : no clubbing of the fingernails [Cyanosis, Localized] : no localized cyanosis [Petechial Hemorrhages (___cm)] : no petechial hemorrhages [] : no ischemic changes [Deep Tendon Reflexes (DTR)] : deep tendon reflexes were 2+ and symmetric [Sensation] : the sensory exam was normal to light touch and pinprick [No Focal Deficits] : no focal deficits [Oriented To Time, Place, And Person] : oriented to person, place, and time [Impaired Insight] : insight and judgment were intact [Affect] : the affect was normal

## 2022-04-07 NOTE — REVIEW OF SYSTEMS
[EDS: ESS=____] : daytime somnolence: ESS=[unfilled] [Fatigue] : fatigue [Recent Wt Loss (___ Lbs)] : recent [unfilled] ~Ulb weight loss [A.M. Dry Mouth] : a.m. dry mouth [Nocturia] : nocturia [Snoring] : snoring [Witnessed Apneas] : demonstrated ~M apnea [Frequent Nocturnal Awakenings] : frequent nocturnal awakenings from sleep [Daytime Somnolence: ESS=____] : daytime somnolence: ESS=[unfilled] [Awakes Unrefreshed] : nonrestorative sleep [Difficulty Maintaining Sleep] : difficulty maintaining sleep [Hypersomnolence] : sleeping much more than usual [Negative] : Psychiatric [Heartburn] : no heartburn [Difficulty Initiating Sleep] : no difficulty falling asleep [Cataplexy] :  no cataplexy [Hypnogogic Hallucinations] : no hypnogogic hallucinations [Hypnopompic Hallucinations] : no hypnopompic hallucinations [Sleep Paralysis] : no sleep paralysis

## 2022-06-17 ENCOUNTER — APPOINTMENT (OUTPATIENT)
Dept: OTOLARYNGOLOGY | Facility: CLINIC | Age: 47
End: 2022-06-17

## 2022-07-13 ENCOUNTER — FORM ENCOUNTER (OUTPATIENT)
Age: 47
End: 2022-07-13

## 2022-07-14 ENCOUNTER — APPOINTMENT (OUTPATIENT)
Dept: SLEEP CENTER | Facility: CLINIC | Age: 47
End: 2022-07-14

## 2022-07-14 ENCOUNTER — OUTPATIENT (OUTPATIENT)
Dept: OUTPATIENT SERVICES | Facility: HOSPITAL | Age: 47
LOS: 1 days | End: 2022-07-14
Payer: COMMERCIAL

## 2022-07-14 ENCOUNTER — FORM ENCOUNTER (OUTPATIENT)
Age: 47
End: 2022-07-14

## 2022-07-14 DIAGNOSIS — Z98.890 OTHER SPECIFIED POSTPROCEDURAL STATES: Chronic | ICD-10-CM

## 2022-07-14 PROCEDURE — 95810 POLYSOM 6/> YRS 4/> PARAM: CPT

## 2022-07-14 PROCEDURE — 95805 MULTIPLE SLEEP LATENCY TEST: CPT | Mod: 26

## 2022-07-14 PROCEDURE — 95805 MULTIPLE SLEEP LATENCY TEST: CPT

## 2022-07-14 PROCEDURE — 95810 POLYSOM 6/> YRS 4/> PARAM: CPT | Mod: 26

## 2022-08-12 ENCOUNTER — NON-APPOINTMENT (OUTPATIENT)
Age: 47
End: 2022-08-12

## 2022-08-19 ENCOUNTER — OUTPATIENT (OUTPATIENT)
Dept: OUTPATIENT SERVICES | Facility: HOSPITAL | Age: 47
LOS: 1 days | End: 2022-08-19
Payer: COMMERCIAL

## 2022-08-19 ENCOUNTER — APPOINTMENT (OUTPATIENT)
Dept: MAMMOGRAPHY | Facility: IMAGING CENTER | Age: 47
End: 2022-08-19

## 2022-08-19 ENCOUNTER — APPOINTMENT (OUTPATIENT)
Dept: ULTRASOUND IMAGING | Facility: IMAGING CENTER | Age: 47
End: 2022-08-19

## 2022-08-19 DIAGNOSIS — Z12.31 ENCOUNTER FOR SCREENING MAMMOGRAM FOR MALIGNANT NEOPLASM OF BREAST: ICD-10-CM

## 2022-08-19 DIAGNOSIS — Z00.8 ENCOUNTER FOR OTHER GENERAL EXAMINATION: ICD-10-CM

## 2022-08-19 DIAGNOSIS — Z98.890 OTHER SPECIFIED POSTPROCEDURAL STATES: Chronic | ICD-10-CM

## 2022-08-19 DIAGNOSIS — R92.2 INCONCLUSIVE MAMMOGRAM: ICD-10-CM

## 2022-08-19 PROCEDURE — 77067 SCR MAMMO BI INCL CAD: CPT | Mod: 26

## 2022-08-19 PROCEDURE — 76641 ULTRASOUND BREAST COMPLETE: CPT | Mod: 26,50

## 2022-08-19 PROCEDURE — 77067 SCR MAMMO BI INCL CAD: CPT

## 2022-08-19 PROCEDURE — 77063 BREAST TOMOSYNTHESIS BI: CPT | Mod: 26

## 2022-08-19 PROCEDURE — 76641 ULTRASOUND BREAST COMPLETE: CPT

## 2022-08-19 PROCEDURE — 77063 BREAST TOMOSYNTHESIS BI: CPT

## 2022-09-01 ENCOUNTER — APPOINTMENT (OUTPATIENT)
Dept: PAIN MANAGEMENT | Facility: CLINIC | Age: 47
End: 2022-09-01

## 2022-09-01 VITALS
BODY MASS INDEX: 26.46 KG/M2 | HEART RATE: 77 BPM | SYSTOLIC BLOOD PRESSURE: 113 MMHG | WEIGHT: 155 LBS | DIASTOLIC BLOOD PRESSURE: 73 MMHG | HEIGHT: 64 IN

## 2022-09-01 DIAGNOSIS — G47.33 OBSTRUCTIVE SLEEP APNEA (ADULT) (PEDIATRIC): ICD-10-CM

## 2022-09-01 PROCEDURE — 99213 OFFICE O/P EST LOW 20 MIN: CPT

## 2022-09-06 NOTE — PHYSICAL EXAM
[General Appearance - Alert] : alert [General Appearance - In No Acute Distress] : in no acute distress [General Appearance - Well Nourished] : well nourished [General Appearance - Well Developed] : well developed [General Appearance - Well-Appearing] : healthy appearing [Oriented To Time, Place, And Person] : oriented to person, place, and time [Impaired Insight] : insight and judgment were intact [Affect] : the affect was normal [Mood] : the mood was normal [Memory Recent] : recent memory was not impaired [Memory Remote] : remote memory was not impaired [Person] : oriented to person [Place] : oriented to place [Time] : oriented to time [Short Term Intact] : short term memory intact [Remote Intact] : remote memory intact [Registration Intact] : recent registration memory intact [Concentration Intact] : normal concentrating ability [Naming Objects] : no difficulty naming common objects [Fluency] : fluency intact [Comprehension] : comprehension intact [Current Events] : adequate knowledge of current events [Past History] : adequate knowledge of personal past history [Vocabulary] : adequate range of vocabulary [Cranial Nerves Oculomotor (III)] : extraocular motion intact [Cranial Nerves Facial (VII)] : face symmetrical [Cranial Nerves Vestibulocochlear (VIII)] : hearing was intact bilaterally [Cranial Nerves Accessory (XI - Cranial And Spinal)] : head turning and shoulder shrug symmetric [Cranial Nerves Hypoglossal (XII)] : there was no tongue deviation with protrusion [No Muscle Atrophy] : normal bulk in all four extremities [Motor Handedness Right-Handed] : the patient is right hand dominant [Sclera] : the sclera and conjunctiva were normal [No VICK] : no internuclear ophthalmoplegia [Outer Ear] : the ears and nose were normal in appearance [Neck Cervical Mass (___cm)] : no neck mass was observed [Exaggerated Use Of Accessory Muscles For Inspiration] : no accessory muscle use [Involuntary Movements] : no involuntary movements were seen [Skin Color & Pigmentation] : normal skin color and pigmentation [] : no rash [Tremor] : no tremor present [Dysdiadochokinesia Bilaterally] : not present [Abnormal Walk] : normal gait

## 2022-09-06 NOTE — REVIEW OF SYSTEMS
[Fever] : no fever [Feeling Poorly] : not feeling poorly [Feeling Tired] : not feeling tired [Eyesight Problems] : no eyesight problems [Chest Pain] : no chest pain [Palpitations] : no palpitations [Shortness Of Breath] : no shortness of breath [Cough] : no cough [Convulsions] : no convulsions [Fainting] : no fainting [Muscle Weakness] : no muscle weakness

## 2022-09-06 NOTE — HISTORY OF PRESENT ILLNESS
[FreeTextEntry1] : Pt notes that she has had good response with use of Nurtec.  Has had moderate benefit but found it slightly less effective then before.\par No change in quality of headache or in associated features.\par No new red flags.\par \par To add prn diclofenac.\par  [Headache] : headache [Nausea] : nausea [Photophobia] : photophobia [Phonophobia] : phonophobia [Scalp Tenderness] : scalp tenderness [> 4 hours] : > 4 hours [improved] : improved [Stable] : The patient reports ~his/her~ symptoms since the last visit are stable [de-identified] : variable

## 2022-10-20 ENCOUNTER — APPOINTMENT (OUTPATIENT)
Dept: PULMONOLOGY | Facility: CLINIC | Age: 47
End: 2022-10-20

## 2022-10-20 VITALS
DIASTOLIC BLOOD PRESSURE: 80 MMHG | HEIGHT: 64 IN | SYSTOLIC BLOOD PRESSURE: 129 MMHG | WEIGHT: 155 LBS | TEMPERATURE: 98 F | HEART RATE: 82 BPM | BODY MASS INDEX: 26.46 KG/M2 | OXYGEN SATURATION: 96 % | RESPIRATION RATE: 15 BRPM

## 2022-10-20 DIAGNOSIS — G47.11 IDIOPATHIC HYPERSOMNIA WITH LONG SLEEP TIME: ICD-10-CM

## 2022-10-20 PROCEDURE — 99214 OFFICE O/P EST MOD 30 MIN: CPT | Mod: GC

## 2022-10-20 RX ORDER — ONABOTULINUMTOXINA 100 [USP'U]/1
100 INJECTION, POWDER, LYOPHILIZED, FOR SOLUTION INTRADERMAL; INTRAMUSCULAR
Qty: 1 | Refills: 0 | Status: ACTIVE | COMMUNITY
Start: 2022-04-25

## 2022-10-20 RX ORDER — ESZOPICLONE 2 MG/1
2 TABLET, FILM COATED ORAL
Qty: 30 | Refills: 0 | Status: ACTIVE | COMMUNITY
Start: 2022-10-20 | End: 1900-01-01

## 2022-10-20 NOTE — PHYSICAL EXAM
[General Appearance - Well Developed] : well developed [Normal Appearance] : normal appearance [Well Groomed] : well groomed [General Appearance - Well Nourished] : well nourished [No Deformities] : no deformities [General Appearance - In No Acute Distress] : no acute distress [Normal Conjunctiva] : the conjunctiva exhibited no abnormalities [Eyelids - No Xanthelasma] : the eyelids demonstrated no xanthelasmas [II] : II [Heart Rate And Rhythm] : heart rate was normal and rhythm regular [Heart Sounds] : normal S1 and S2 [Heart Sounds Gallop] : no gallops [Murmurs] : no murmurs [Heart Sounds Pericardial Friction Rub] : no pericardial rub [Auscultation Breath Sounds / Voice Sounds] : lungs were clear to auscultation bilaterally [Abnormal Walk] : normal gait [Musculoskeletal - Swelling] : no joint swelling seen [Motor Tone] : muscle strength and tone were normal [Nail Clubbing] : no clubbing of the fingernails [Cyanosis, Localized] : no localized cyanosis [Petechial Hemorrhages (___cm)] : no petechial hemorrhages [] : no ischemic changes [Deep Tendon Reflexes (DTR)] : deep tendon reflexes were 2+ and symmetric [Sensation] : the sensory exam was normal to light touch and pinprick [No Focal Deficits] : no focal deficits [Oriented To Time, Place, And Person] : oriented to person, place, and time [Impaired Insight] : insight and judgment were intact [Affect] : the affect was normal [Neck Appearance] : the appearance of the neck was normal [Neck Cervical Mass (___cm)] : no neck mass was observed [Jugular Venous Distention Increased] : there was no jugular-venous distention [Thyroid Diffuse Enlargement] : the thyroid was not enlarged [Thyroid Nodule] : there were no palpable thyroid nodules [Low Lying Soft Palate] : low lying soft palate

## 2022-10-20 NOTE — HISTORY OF PRESENT ILLNESS
[Snoring] : snoring [Witnessed Apneas] : witnessed sleep apnea [Frequent Nocturnal Awakening] : frequent nocturnal awakening [Unintentional Sleep While Inactive] : unintentional sleep while inactive [Awakes Unrefreshed] : awakening unrefreshed [Daytime Somnolence] : daytime somnolence [DMS] : DMS [Hypersomnolence] : hypersomnolence [AHI: ___ per hour] : Apnea-hypopnea index:  [unfilled] per hour [T90%: ___] : T90%: [unfilled]% [Neel desatuation%: ___] : Neel desaturation:  [unfilled]% [MSLT] : MSLT was performed [Date: ___] : It was done [unfilled] [Mean Sleep Onset Latency: ___ minutes] : Mean Sleep Onset Latency: [unfilled] minutes [# of Sleep Onset REM Episodes: ___] : Number of Sleep Onset REM episodes: [unfilled] [FreeTextEntry1] : 47 y/o F with a PMH of partial R thyroid removal (2015 2/2 cyst) and migraine headaches presented to the sleep clinic for follow up of excessive daytime somnolence and PSG/MSLT.\par \par Works as . Goes to bed at 10pm without difficulty sleeping. Frequent nocturnal awakenings at least twice a night. Wakes up 615am. Naps on average 3 times a month due to being busy. Still reports excessive daytime fatigue and somnolence. Does not report cataplexy. ESS 17 today. Drinks coffee twice a week and teas twice a month. No energy drinks. No smoking/nicotine. Sleep quality and daytime fatigue do not improve when on vacation. Was told t3/t4 was abnormal about 1 week ago, pending PCP follow up.\par \par ____________________________________________________________________\par EPWORTH SLEEPINESS SCALE\par \par How likely are you to doze off or fall asleep in the situations described below, in contrast to feeling just tired?  \par This refers to your usual way of life in recent times.  \par Even if you haven't done some of these things recently, try to work out how they would have affected you.\par Use the following scale to choose one most appropriate number for each situation.\par \par Chance of dozing.............Situation\par ...............3........................Sitting and reading\par ...............3.........................Watching TV\par ...............2.........................Sitting inactive in a public place (eg a theatre or a meeting)\par ...............3.........................As a passenger in a car for an hour without a break\par ...............3.........................Lying down to rest in the afternoon when circumstances permit\par ...............0.........................Sitting and talking to someone\par ...............3.........................Sitting quietly after lunch without alcohol\par ...............0.........................In a car, while stopped for a few minutes in traffic\par \par ...............17.........................TOTAL SCORE\par \par 0 = Never would doze\par 1 = Slight chance of dozing\par 2 = Moderate chance of dozing\par 3 = High chance of dozing \par ______________________________________________________________________  [Unintentional Sleep while Active] : no unintentional sleep while active [Awakes with Headache] : no headache upon awakening [Awakening With Dry Mouth] : no dry mouth upon awakening [Recent  Weight Gain] : no recent weight gain [DIS] : no DIS [Unusual Sleep Behavior] : no unusual sleep behavior [Cataplexy] : no cataplexy [Sleep Paralysis] : no sleep paralysis [Hypnagogic Hallucinations] : no hallucinations when falling asleep [Hypnopompic Hallucinations] : no hallucinations when awakening [Lower Extremity Discomfort] : no lower extremity discomfort in evening or at bedtime [ESS] : 17

## 2022-10-20 NOTE — ASSESSMENT
[FreeTextEntry1] : 45 y/o F with a PMH of partial R thyroid removal (2015 2/2 cyst) and migraine headaches presented to the sleep clinic for follow up of excessive daytime somnolence and PSG/MSLT. She is with persistent symptoms of excessive daytime somnolence without signs of cataplexy. PSG showing severely fragment sleep with significant nocturnal awakenings with reduction of sleep latency on MSLT. 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 Suspect component of her sleepiness is secondary to poor sleep quality rather than primary IH. Will prescribe 2mg eszopiclone at this time to improve sleep consolidation. Pending response will consider continuation vs. addition of stimulant. Also recommended follow up with PCP/endo for abnormal thyroid studies as may be contributing to low sleep arousal threshold.\par \par Follow up in 1 month

## 2022-11-17 NOTE — ED ADULT NURSE NOTE - BREATHING, MLM
Verbal telephone consent for [facility] received from son/poa Odette Cooley prior to SN arrival. SN greeted patient in [place] where she resides. Patient is AOx3, forgetful at times, pleasant, in no apparent distress upon arrival and agreeable to sn start of care home visit. Start of care packet reviewed and consents to be mailed to [facility] for signatures. SN assessment and vs completed. VSS. HRR, LCTA, BS WNL. Referral received for wound care to RUE and BLE. All wounds are healed and skin is intact. Patient states appetite is good and hydration is adequate. Patient denies having any pain in the past 24 hours. Medications reviewed. Home safety check completed. Patient's son/POA states patient has had episodes of paranoia, thinking the [place] is outside her window. Patient denies having any paranoia or hallucinations at this time, concentration is appropriate. Patient will benefit from SN services at this time for disease process and management, med management, infection control, home safety, education. Patient is homebound due to weakness, impaired functional mobility, risk for fall, risk for infection, patient requires use of assistive device and patient requires assistance of another person to leave home safely. SN educated patient on disease process and management, home safety, using assistive device at all times, fall precautions, s/s of infection, taking medications as prescribed, call me first procedure, s/s to report to nurse, physician, and s/s that necessitate calling emergency personnel. Patient repeated back and verbalized understanding, all questions answered. SN ended visit with pt AOx3, forgetful at times, pleasant, in no apparent distress and agreeable to plan of care. Discharge planning ongoing until all goals met. Spontaneous, unlabored and symmetrical

## 2022-11-30 NOTE — ED ADULT NURSE REASSESSMENT NOTE - SYMPTOMS
right chest/chest pain Price (Do Not Change): 0.00 Detail Level: Generalized Instructions: This plan will send the code FBSE to the PM system.  DO NOT or CHANGE the price.

## 2023-03-30 ENCOUNTER — APPOINTMENT (OUTPATIENT)
Dept: PAIN MANAGEMENT | Facility: CLINIC | Age: 48
End: 2023-03-30
Payer: COMMERCIAL

## 2023-03-30 VITALS
WEIGHT: 155 LBS | SYSTOLIC BLOOD PRESSURE: 114 MMHG | HEIGHT: 64 IN | BODY MASS INDEX: 26.46 KG/M2 | DIASTOLIC BLOOD PRESSURE: 79 MMHG | HEART RATE: 73 BPM

## 2023-03-30 DIAGNOSIS — G47.10 HYPERSOMNIA, UNSPECIFIED: ICD-10-CM

## 2023-03-30 DIAGNOSIS — G43.901 MIGRAINE, UNSPECIFIED, NOT INTRACTABLE, WITH STATUS MIGRAINOSUS: ICD-10-CM

## 2023-03-30 PROCEDURE — 99213 OFFICE O/P EST LOW 20 MIN: CPT

## 2023-03-30 NOTE — PHYSICAL EXAM
[General Appearance - Alert] : alert [General Appearance - In No Acute Distress] : in no acute distress [General Appearance - Well Nourished] : well nourished [General Appearance - Well Developed] : well developed [General Appearance - Well-Appearing] : healthy appearing [Oriented To Time, Place, And Person] : oriented to person, place, and time [Impaired Insight] : insight and judgment were intact [Affect] : the affect was normal [Mood] : the mood was normal [Memory Recent] : recent memory was not impaired [Memory Remote] : remote memory was not impaired [Person] : oriented to person [Place] : oriented to place [Time] : oriented to time [Short Term Intact] : short term memory intact [Remote Intact] : remote memory intact [Registration Intact] : recent registration memory intact [Concentration Intact] : normal concentrating ability [Naming Objects] : no difficulty naming common objects [Fluency] : fluency intact [Comprehension] : comprehension intact [Current Events] : adequate knowledge of current events [Past History] : adequate knowledge of personal past history [Vocabulary] : adequate range of vocabulary [Cranial Nerves Oculomotor (III)] : extraocular motion intact [Cranial Nerves Facial (VII)] : face symmetrical [Cranial Nerves Vestibulocochlear (VIII)] : hearing was intact bilaterally [Cranial Nerves Accessory (XI - Cranial And Spinal)] : head turning and shoulder shrug symmetric [Cranial Nerves Hypoglossal (XII)] : there was no tongue deviation with protrusion [No Muscle Atrophy] : normal bulk in all four extremities [Motor Handedness Right-Handed] : the patient is right hand dominant [Tremor] : no tremor present [Dysdiadochokinesia Bilaterally] : not present [Sclera] : the sclera and conjunctiva were normal [No VICK] : no internuclear ophthalmoplegia [Outer Ear] : the ears and nose were normal in appearance [Neck Cervical Mass (___cm)] : no neck mass was observed [Exaggerated Use Of Accessory Muscles For Inspiration] : no accessory muscle use [Abnormal Walk] : normal gait [Involuntary Movements] : no involuntary movements were seen [Skin Color & Pigmentation] : normal skin color and pigmentation [] : no rash

## 2023-03-30 NOTE — HISTORY OF PRESENT ILLNESS
[FreeTextEntry1] : Pt notes that she has been doing well overall for her headaches.  However, did have endodontic procedure and was given high dose of amoxicillin

## 2023-10-17 ENCOUNTER — APPOINTMENT (OUTPATIENT)
Dept: PAIN MANAGEMENT | Facility: CLINIC | Age: 48
End: 2023-10-17
Payer: COMMERCIAL

## 2023-10-17 VITALS
DIASTOLIC BLOOD PRESSURE: 70 MMHG | SYSTOLIC BLOOD PRESSURE: 114 MMHG | WEIGHT: 155 LBS | HEART RATE: 77 BPM | BODY MASS INDEX: 26.46 KG/M2 | HEIGHT: 64 IN

## 2023-10-17 PROCEDURE — 99213 OFFICE O/P EST LOW 20 MIN: CPT

## 2023-10-17 RX ORDER — DICLOFENAC SODIUM 75 MG/1
75 TABLET, DELAYED RELEASE ORAL
Qty: 30 | Refills: 3 | Status: ACTIVE | COMMUNITY
Start: 2020-05-06 | End: 1900-01-01

## 2023-10-17 RX ORDER — ONDANSETRON 4 MG/1
4 TABLET, ORALLY DISINTEGRATING ORAL
Qty: 30 | Refills: 2 | Status: ACTIVE | COMMUNITY
Start: 2018-09-20 | End: 1900-01-01

## 2023-10-26 ENCOUNTER — APPOINTMENT (OUTPATIENT)
Dept: ULTRASOUND IMAGING | Facility: IMAGING CENTER | Age: 48
End: 2023-10-26
Payer: COMMERCIAL

## 2023-10-26 ENCOUNTER — APPOINTMENT (OUTPATIENT)
Dept: MAMMOGRAPHY | Facility: IMAGING CENTER | Age: 48
End: 2023-10-26
Payer: COMMERCIAL

## 2023-10-26 ENCOUNTER — OUTPATIENT (OUTPATIENT)
Dept: OUTPATIENT SERVICES | Facility: HOSPITAL | Age: 48
LOS: 1 days | End: 2023-10-26
Payer: COMMERCIAL

## 2023-10-26 DIAGNOSIS — Z00.8 ENCOUNTER FOR OTHER GENERAL EXAMINATION: ICD-10-CM

## 2023-10-26 DIAGNOSIS — Z98.890 OTHER SPECIFIED POSTPROCEDURAL STATES: Chronic | ICD-10-CM

## 2023-10-26 PROCEDURE — 76641 ULTRASOUND BREAST COMPLETE: CPT | Mod: 26,50

## 2023-10-26 PROCEDURE — 77067 SCR MAMMO BI INCL CAD: CPT

## 2023-10-26 PROCEDURE — 77063 BREAST TOMOSYNTHESIS BI: CPT

## 2023-10-26 PROCEDURE — 76641 ULTRASOUND BREAST COMPLETE: CPT

## 2023-10-26 PROCEDURE — 77063 BREAST TOMOSYNTHESIS BI: CPT | Mod: 26

## 2023-10-26 PROCEDURE — 77067 SCR MAMMO BI INCL CAD: CPT | Mod: 26

## 2023-11-01 RX ORDER — FREMANEZUMAB-VFRM 225 MG/1.5ML
225 INJECTION SUBCUTANEOUS
Qty: 3 | Refills: 3 | Status: ACTIVE | COMMUNITY
Start: 2023-10-23 | End: 1900-01-01

## 2023-12-21 ENCOUNTER — APPOINTMENT (OUTPATIENT)
Dept: PAIN MANAGEMENT | Facility: CLINIC | Age: 48
End: 2023-12-21

## 2024-04-24 ENCOUNTER — NON-APPOINTMENT (OUTPATIENT)
Age: 49
End: 2024-04-24

## 2024-04-25 ENCOUNTER — APPOINTMENT (OUTPATIENT)
Dept: PAIN MANAGEMENT | Facility: CLINIC | Age: 49
End: 2024-04-25
Payer: COMMERCIAL

## 2024-04-25 VITALS
WEIGHT: 157 LBS | HEIGHT: 64 IN | HEART RATE: 83 BPM | BODY MASS INDEX: 26.8 KG/M2 | DIASTOLIC BLOOD PRESSURE: 79 MMHG | SYSTOLIC BLOOD PRESSURE: 124 MMHG

## 2024-04-25 PROCEDURE — G2211 COMPLEX E/M VISIT ADD ON: CPT

## 2024-04-25 PROCEDURE — 99213 OFFICE O/P EST LOW 20 MIN: CPT

## 2024-04-25 NOTE — HISTORY OF PRESENT ILLNESS
[FreeTextEntry1] : Pt returns today for a migraine follow up appt.  Qulipta was not approved by insurance . Ajovy rx sent in but patient dies not wish  to take an injection at this time.  Does not wish to have Botox injections either.   Taking Nurtec prn , which is effective for the most part.   Did have a longer exacerbation in January but denies any new symtoms.  [Headache] : headache [Nausea] : nausea [Photophobia] : photophobia [Phonophobia] : phonophobia [> 15 days per month] : > 15 days per month

## 2024-04-25 NOTE — PHYSICAL EXAM
[General Appearance - Alert] : alert [General Appearance - In No Acute Distress] : in no acute distress [General Appearance - Well Nourished] : well nourished [General Appearance - Well Developed] : well developed [General Appearance - Well-Appearing] : healthy appearing [Oriented To Time, Place, And Person] : oriented to person, place, and time [Impaired Insight] : insight and judgment were intact [Affect] : the affect was normal [Mood] : the mood was normal [Memory Recent] : recent memory was not impaired [Memory Remote] : remote memory was not impaired [Person] : oriented to person [Place] : oriented to place [Time] : oriented to time [Short Term Intact] : short term memory intact [Remote Intact] : remote memory intact [Registration Intact] : recent registration memory intact [Concentration Intact] : normal concentrating ability [Naming Objects] : no difficulty naming common objects [Fluency] : fluency intact [Comprehension] : comprehension intact [Current Events] : adequate knowledge of current events [Past History] : adequate knowledge of personal past history [Vocabulary] : adequate range of vocabulary [Cranial Nerves Oculomotor (III)] : extraocular motion intact [Cranial Nerves Facial (VII)] : face symmetrical [Cranial Nerves Vestibulocochlear (VIII)] : hearing was intact bilaterally [Cranial Nerves Hypoglossal (XII)] : there was no tongue deviation with protrusion [Cranial Nerves Accessory (XI - Cranial And Spinal)] : head turning and shoulder shrug symmetric [No Muscle Atrophy] : normal bulk in all four extremities [Motor Handedness Right-Handed] : the patient is right hand dominant [Tremor] : no tremor present [Dysdiadochokinesia Bilaterally] : not present [Sclera] : the sclera and conjunctiva were normal [No VICK] : no internuclear ophthalmoplegia [Outer Ear] : the ears and nose were normal in appearance [Neck Cervical Mass (___cm)] : no neck mass was observed [Exaggerated Use Of Accessory Muscles For Inspiration] : no accessory muscle use [Abnormal Walk] : normal gait [Involuntary Movements] : no involuntary movements were seen [Skin Color & Pigmentation] : normal skin color and pigmentation [] : no rash

## 2024-04-26 RX ORDER — RIMEGEPANT SULFATE 75 MG/75MG
75 TABLET, ORALLY DISINTEGRATING ORAL
Qty: 16 | Refills: 3 | Status: ACTIVE | COMMUNITY
Start: 2021-01-14 | End: 1900-01-01

## 2024-05-03 ENCOUNTER — APPOINTMENT (OUTPATIENT)
Dept: RADIOLOGY | Facility: IMAGING CENTER | Age: 49
End: 2024-05-03
Payer: COMMERCIAL

## 2024-05-03 ENCOUNTER — OUTPATIENT (OUTPATIENT)
Dept: OUTPATIENT SERVICES | Facility: HOSPITAL | Age: 49
LOS: 1 days | End: 2024-05-03
Payer: COMMERCIAL

## 2024-05-03 DIAGNOSIS — Z98.890 OTHER SPECIFIED POSTPROCEDURAL STATES: Chronic | ICD-10-CM

## 2024-05-03 DIAGNOSIS — Z00.8 ENCOUNTER FOR OTHER GENERAL EXAMINATION: ICD-10-CM

## 2024-05-03 PROCEDURE — 77080 DXA BONE DENSITY AXIAL: CPT | Mod: 26

## 2024-05-03 PROCEDURE — 77080 DXA BONE DENSITY AXIAL: CPT

## 2024-06-22 NOTE — ED ADULT NURSE NOTE - PATIENT DISCHARGE SIGNATURE
[NL] : warm, clear [de-identified] : left big toe with ingrown nail medially and some mild local redness 25-Sep-2018

## 2024-08-20 ENCOUNTER — APPOINTMENT (OUTPATIENT)
Dept: PAIN MANAGEMENT | Facility: CLINIC | Age: 49
End: 2024-08-20

## 2025-03-27 NOTE — ASSESSMENT
[FreeTextEntry1] : Taper off Topamax .\par Pt was unable to attain Nurtec . \par I will send it to [facility] .\par RTO 1month 
Medical Assessment Completed on: 27-Mar-2025 20:18

## 2025-09-16 ENCOUNTER — NON-APPOINTMENT (OUTPATIENT)
Age: 50
End: 2025-09-16

## 2025-09-24 PROBLEM — M25.511 RIGHT SHOULDER PAIN: Status: ACTIVE | Noted: 2025-09-24

## 2025-09-25 PROBLEM — M75.31 CALCIFIC TENDINITIS OF RIGHT SHOULDER: Status: ACTIVE | Noted: 2025-09-25
